# Patient Record
Sex: MALE | Race: WHITE | NOT HISPANIC OR LATINO | Employment: FULL TIME | URBAN - METROPOLITAN AREA
[De-identification: names, ages, dates, MRNs, and addresses within clinical notes are randomized per-mention and may not be internally consistent; named-entity substitution may affect disease eponyms.]

---

## 2018-05-24 ENCOUNTER — HOSPITAL ENCOUNTER (EMERGENCY)
Facility: HOSPITAL | Age: 28
Discharge: HOME | End: 2018-05-24
Attending: EMERGENCY MEDICINE | Admitting: EMERGENCY MEDICINE
Payer: COMMERCIAL

## 2018-05-24 VITALS
DIASTOLIC BLOOD PRESSURE: 84 MMHG | HEART RATE: 96 BPM | BODY MASS INDEX: 26.88 KG/M2 | WEIGHT: 182 LBS | SYSTOLIC BLOOD PRESSURE: 141 MMHG | RESPIRATION RATE: 20 BRPM | TEMPERATURE: 97.8 F | OXYGEN SATURATION: 98 %
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
## 2018-05-24 PROCEDURE — 99283 EMERGENCY DEPT VISIT LOW MDM: CPT

## 2018-05-24 RX ORDER — TETRACAINE HYDROCHLORIDE 5 MG/ML
1 SOLUTION OPHTHALMIC ONCE
Status: COMPLETED | OUTPATIENT
Start: 2018-05-24 | End: 2018-05-24

## 2018-05-24 RX ORDER — NAPROXEN 500 MG/1
500 TABLET ORAL ONCE
Status: COMPLETED | OUTPATIENT
Start: 2018-05-24 | End: 2018-05-24

## 2018-05-24 RX ORDER — FENOFIBRATE 145 MG/1
145 TABLET, COATED ORAL DAILY
Status: ON HOLD | COMMUNITY
End: 2021-01-25 | Stop reason: SDUPTHER

## 2018-05-24 RX ADMIN — NAPROXEN 500 MG: 500 TABLET ORAL at 23:23

## 2018-05-24 RX ADMIN — FLUORESCEIN SODIUM 1 STRIP: 0.6 STRIP OPHTHALMIC at 21:55

## 2018-05-24 RX ADMIN — TETRACAINE HYDROCHLORIDE 1 DROP: 5 SOLUTION OPHTHALMIC at 21:55

## 2018-05-25 NOTE — DISCHARGE INSTRUCTIONS
Corneal Flash Huffman   WHAT YOU NEED TO KNOW:   A corneal flash burn is when your cornea is burned by too much ultraviolet (UV) light  The cornea is the clear layer of tissue that covers the front of your eye  DISCHARGE INSTRUCTIONS:   Medicines:   · Pain medicine: You may be given prescription medicine to take away or decrease pain  Do not wait until the pain is severe before you take your medicine  This medicine may be given as an eye drop or pill  · Antibiotic medicine: This medicine will help prevent an eye infection  It may be given as an eye drop or ointment  · Cycloplegic medicine: This medicine dilates your pupil and relaxes your eye muscles  This will help decrease your pain  · Take your medicine as directed  Contact your healthcare provider if you think your medicine is not helping or if you have side effects  Tell him of her if you are allergic to any medicine  Keep a list of the medicines, vitamins, and herbs you take  Include the amounts, and when and why you take them  Bring the list or the pill bottles to follow-up visits  Carry your medicine list with you in case of an emergency  Follow up with your healthcare provider or ophthalmologist in 12 to 24 hours: You may need to return to have your eye and vision checked  Write down your questions so you remember to ask them during your visits  Artificial tears and ointment:  Artificial tears are used to keep your eye moist  Ointment is used to soothe and protect your eye  This will decrease your pain and help prevent your eyelid from sticking to your eye  Use as directed  Cool, moist bandage: This is applied to your eye and covered with a small ice pack to decrease pain  Use as directed  Eye patch: An eye patch or plastic shield will help protect your eye as it heals  Wear as long as directed by your healthcare provider    Prevent another corneal flash burn:   · Wear sunglasses when you are outside:  Check your sunglasses for a label that says it blocks UV light  Choose sunglasses that protect as much of your eyes as possible  Do not look directly into the sun  · Wear a hat:  Wear a hat or a cap with a wide brim to shade your eyes from sunlight  · Wear tanning bed goggles: This will decrease the amount of UV light that reaches your eyes while you tan  · Wear proper work equipment:  Goggles and helmets will help protect your eyes if you work with welding tools  Contact your healthcare provider or ophthalmologist if:   · You have pain after 2 days of treatment  · Your vision does not return to normal     · You have questions or concerns about your condition or care  Return to the emergency department if:   · You have severe pain  · Your eye is leaking blood or pus  · Your vision suddenly becomes worse  © 2017 [facility] Information is for End User's use only and may not be sold, redistributed or otherwise used for commercial purposes  All illustrations and images included in CareNotes® are the copyrighted property of [employer]  or Lupillo Negron  The above information is an  only  It is not intended as medical advice for individual conditions or treatments  Talk to your doctor, nurse or pharmacist before following any medical regimen to see if it is safe and effective for you

## 2018-05-25 NOTE — ED PROVIDER NOTES
History  Chief Complaint   Patient presents with    Burning Eyes     States he is a  , put up his helmet to chip slag away at 3 pm and eyes started with burning /tearing immediately and is getting worse  Had use lubricant eye drops  Patient drove to ED    [facility]     States when he was chipping slag hot slag went into left eye       DID NOT USE THE HELMET AT THE END OF THE JOB  C/O B/L EYE BURNING PAIN, TEARS  FLUORESCENCE EXAM NEG FOR ULCERS  Eye Pain   Location:  B/L EYES  Severity:  Moderate  Onset quality:  Sudden  Duration:  6 hours  Timing:  Constant  Progression:  Worsening  Chronicity:  New      Prior to Admission Medications   Prescriptions Last Dose Informant Patient Reported? Taking?   fenofibrate (TRICOR) 145 mg tablet 5/24/2018 at Unknown time Self Yes Yes   Sig: Take 145 mg by mouth daily      Facility-Administered Medications: None       Past Medical History:   Diagnosis Date    Hyperlipidemia        Past Surgical History:   Procedure Laterality Date    BURN DEBRIDEMENT SURGERY         History reviewed  No pertinent family history  I have reviewed and agree with the history as documented  Social History   Substance Use Topics    Smoking status: Current Every Day Smoker     Packs/day: 1 50     Types: Cigarettes    Smokeless tobacco: Never Used    Alcohol use Yes      Comment: social         Review of Systems   Constitutional: Negative  HENT: Negative  Eyes: Positive for pain  Physical Exam  Physical Exam   Constitutional: He appears well-developed and well-nourished  No distress  HENT:   Head: Normocephalic and atraumatic  Eyes: EOM are normal  Pupils are equal, round, and reactive to light  No foreign body present in the right eye  No foreign body present in the left eye  Right conjunctiva is injected  Left conjunctiva is injected  Skin: He is not diaphoretic  Nursing note and vitals reviewed        Vital Signs  ED Triage Vitals   Temperature Pulse Respirations Blood Pressure SpO2   05/24/18 2134 05/24/18 2134 05/24/18 2134 05/24/18 2134 05/24/18 2137   97 8 °F (36 6 °C) 96 20 141/84 98 %      Temp Source Heart Rate Source Patient Position - Orthostatic VS BP Location FiO2 (%)   05/24/18 2134 05/24/18 2134 05/24/18 2134 05/24/18 2134 --   Tympanic Monitor Lying Left arm       Pain Score       05/24/18 2137       8           Vitals:    05/24/18 2134   BP: 141/84   Pulse: 96   Patient Position - Orthostatic VS: Lying       Visual Acuity  Visual Acuity      Most Recent Value   Visual acuity R eye is  20/20 [(-1)]   Visual acuity Left eye is  20/20 [(-2)]   No corrective eyewear/lenses  Yes          ED Medications  Medications   tetracaine 0 5 % ophthalmic solution 1 drop (1 drop Both Eyes Given 5/24/18 2155)   fluorescein sodium sterile ophthalmic strip 1 strip (1 strip Both Eyes Given 5/24/18 2155)   naproxen (NAPROSYN) tablet 500 mg (500 mg Oral Given 5/24/18 2323)       Diagnostic Studies  Results Reviewed     None                 No orders to display              Procedures  Procedures       Phone Contacts  ED Phone Contact    ED Course                               MDM  Number of Diagnoses or Management Options  Diagnosis management comments: PAIN IMPROVED, PT HAS ARTIFICIAL TEARS BOTTLE    CritCare Time    Disposition  Final diagnoses:   Exposure to welding light (arc), initial encounter     Time reflects when diagnosis was documented in both MDM as applicable and the Disposition within this note     Time User Action Codes Description Comment    5/24/2018 11:47 PM Enrique Horta Add [W89  0XXA] Exposure to welding light (arc), initial encounter       ED Disposition     ED Disposition Condition Comment    Discharge  [address] discharge to home/self care      Condition at discharge: Stable        Follow-up Information     Follow up With Specialties Details Why Susu Strong MD Ophthalmology Schedule an appointment as soon as possible for a visit in 2 days  [address] [place] [address]  [phone]            Patient's Medications   Discharge Prescriptions    No medications on file     No discharge procedures on file      ED Provider  Electronically Signed by           Rhonda Cho MD  05/24/18 2702

## 2021-01-23 ENCOUNTER — APPOINTMENT (EMERGENCY)
Dept: RADIOLOGY | Facility: HOSPITAL | Age: 31
End: 2021-01-23
Attending: EMERGENCY MEDICINE
Payer: COMMERCIAL

## 2021-01-23 ENCOUNTER — HOSPITAL ENCOUNTER (OUTPATIENT)
Facility: HOSPITAL | Age: 31
Setting detail: OBSERVATION
Discharge: HOME | End: 2021-01-25
Attending: EMERGENCY MEDICINE | Admitting: INTERNAL MEDICINE
Payer: COMMERCIAL

## 2021-01-23 DIAGNOSIS — R07.9 CHEST PAIN: Primary | ICD-10-CM

## 2021-01-23 DIAGNOSIS — R94.31 ABNORMAL EKG: ICD-10-CM

## 2021-01-23 PROBLEM — E87.6 HYPOKALEMIA: Status: ACTIVE | Noted: 2021-01-23

## 2021-01-23 PROBLEM — D72.829 LEUKOCYTOSIS: Status: ACTIVE | Noted: 2021-01-23

## 2021-01-23 PROBLEM — E78.2 MIXED HYPERLIPIDEMIA: Status: ACTIVE | Noted: 2021-01-23

## 2021-01-23 LAB
ALBUMIN SERPL BCP-MCNC: 4.4 G/DL (ref 3.5–5)
ALP SERPL-CCNC: 70 U/L (ref 46–116)
ALT SERPL W P-5'-P-CCNC: 34 U/L (ref 12–78)
ANION GAP SERPL CALCULATED.3IONS-SCNC: 9 MMOL/L (ref 4–13)
APTT PPP: 30 SECONDS (ref 23–37)
AST SERPL W P-5'-P-CCNC: 19 U/L (ref 5–45)
BASOPHILS # BLD AUTO: 0.05 THOUSANDS/ΜL (ref 0–0.1)
BASOPHILS NFR BLD AUTO: 0 % (ref 0–1)
BILIRUB SERPL-MCNC: 1.1 MG/DL (ref 0.2–1)
BUN SERPL-MCNC: 17 MG/DL (ref 5–25)
CALCIUM SERPL-MCNC: 9 MG/DL (ref 8.3–10.1)
CHLORIDE SERPL-SCNC: 98 MMOL/L (ref 100–108)
CO2 SERPL-SCNC: 27 MMOL/L (ref 21–32)
CREAT SERPL-MCNC: 1 MG/DL (ref 0.6–1.3)
D DIMER PPP FEU-MCNC: 0.38 UG/ML FEU
EOSINOPHIL # BLD AUTO: 0.12 THOUSAND/ΜL (ref 0–0.61)
EOSINOPHIL NFR BLD AUTO: 1 % (ref 0–6)
ERYTHROCYTE [DISTWIDTH] IN BLOOD BY AUTOMATED COUNT: 11.8 % (ref 11.6–15.1)
GFR SERPL CREATININE-BSD FRML MDRD: 99 ML/MIN/1.73SQ M
GLUCOSE SERPL-MCNC: 127 MG/DL (ref 65–140)
HCT VFR BLD AUTO: 46.5 % (ref 36.5–49.3)
HGB BLD-MCNC: 15.6 G/DL (ref 12–17)
IMM GRANULOCYTES # BLD AUTO: 0.08 THOUSAND/UL (ref 0–0.2)
IMM GRANULOCYTES NFR BLD AUTO: 1 % (ref 0–2)
INR PPP: 1.01 (ref 0.84–1.19)
LYMPHOCYTES # BLD AUTO: 2.12 THOUSANDS/ΜL (ref 0.6–4.47)
LYMPHOCYTES NFR BLD AUTO: 12 % (ref 14–44)
MCH RBC QN AUTO: 29.8 PG (ref 26.8–34.3)
MCHC RBC AUTO-ENTMCNC: 33.5 G/DL (ref 31.4–37.4)
MCV RBC AUTO: 89 FL (ref 82–98)
MONOCYTES # BLD AUTO: 1.47 THOUSAND/ΜL (ref 0.17–1.22)
MONOCYTES NFR BLD AUTO: 9 % (ref 4–12)
NEUTROPHILS # BLD AUTO: 13.26 THOUSANDS/ΜL (ref 1.85–7.62)
NEUTS SEG NFR BLD AUTO: 77 % (ref 43–75)
NRBC BLD AUTO-RTO: 0 /100 WBCS
PLATELET # BLD AUTO: 291 THOUSANDS/UL (ref 149–390)
PMV BLD AUTO: 8.9 FL (ref 8.9–12.7)
POTASSIUM SERPL-SCNC: 3.2 MMOL/L (ref 3.5–5.3)
PROT SERPL-MCNC: 8 G/DL (ref 6.4–8.2)
PROTHROMBIN TIME: 13.2 SECONDS (ref 11.6–14.5)
RBC # BLD AUTO: 5.24 MILLION/UL (ref 3.88–5.62)
SODIUM SERPL-SCNC: 134 MMOL/L (ref 136–145)
TROPONIN I SERPL-MCNC: <0.02 NG/ML
TROPONIN I SERPL-MCNC: <0.02 NG/ML
WBC # BLD AUTO: 17.1 THOUSAND/UL (ref 4.31–10.16)

## 2021-01-23 PROCEDURE — 85379 FIBRIN DEGRADATION QUANT: CPT | Performed by: EMERGENCY MEDICINE

## 2021-01-23 PROCEDURE — 85025 COMPLETE CBC W/AUTO DIFF WBC: CPT | Performed by: EMERGENCY MEDICINE

## 2021-01-23 PROCEDURE — 36415 COLL VENOUS BLD VENIPUNCTURE: CPT | Performed by: EMERGENCY MEDICINE

## 2021-01-23 PROCEDURE — 84484 ASSAY OF TROPONIN QUANT: CPT | Performed by: EMERGENCY MEDICINE

## 2021-01-23 PROCEDURE — 71045 X-RAY EXAM CHEST 1 VIEW: CPT

## 2021-01-23 PROCEDURE — 80053 COMPREHEN METABOLIC PANEL: CPT | Performed by: EMERGENCY MEDICINE

## 2021-01-23 PROCEDURE — 99285 EMERGENCY DEPT VISIT HI MDM: CPT

## 2021-01-23 PROCEDURE — 85610 PROTHROMBIN TIME: CPT | Performed by: EMERGENCY MEDICINE

## 2021-01-23 PROCEDURE — 99285 EMERGENCY DEPT VISIT HI MDM: CPT | Performed by: EMERGENCY MEDICINE

## 2021-01-23 PROCEDURE — 84484 ASSAY OF TROPONIN QUANT: CPT | Performed by: NURSE PRACTITIONER

## 2021-01-23 PROCEDURE — 93005 ELECTROCARDIOGRAM TRACING: CPT

## 2021-01-23 PROCEDURE — 99220 PR INITIAL OBSERVATION CARE/DAY 70 MINUTES: CPT | Performed by: NURSE PRACTITIONER

## 2021-01-23 PROCEDURE — 85730 THROMBOPLASTIN TIME PARTIAL: CPT | Performed by: EMERGENCY MEDICINE

## 2021-01-23 RX ORDER — NICOTINE 21 MG/24HR
1 PATCH, TRANSDERMAL 24 HOURS TRANSDERMAL DAILY
Status: DISCONTINUED | OUTPATIENT
Start: 2021-01-24 | End: 2021-01-25 | Stop reason: HOSPADM

## 2021-01-23 RX ORDER — FENOFIBRATE 145 MG/1
145 TABLET, COATED ORAL DAILY
Status: DISCONTINUED | OUTPATIENT
Start: 2021-01-24 | End: 2021-01-25 | Stop reason: HOSPADM

## 2021-01-23 RX ORDER — POTASSIUM CHLORIDE 20 MEQ/1
40 TABLET, EXTENDED RELEASE ORAL ONCE
Status: COMPLETED | OUTPATIENT
Start: 2021-01-23 | End: 2021-01-23

## 2021-01-23 RX ORDER — ASPIRIN 81 MG/1
81 TABLET, CHEWABLE ORAL DAILY
Status: DISCONTINUED | OUTPATIENT
Start: 2021-01-24 | End: 2021-01-25 | Stop reason: HOSPADM

## 2021-01-23 RX ORDER — NITROGLYCERIN 0.4 MG/1
0.4 TABLET SUBLINGUAL
Status: DISCONTINUED | OUTPATIENT
Start: 2021-01-23 | End: 2021-01-25 | Stop reason: HOSPADM

## 2021-01-23 RX ORDER — ASPIRIN 81 MG/1
324 TABLET, CHEWABLE ORAL ONCE
Status: COMPLETED | OUTPATIENT
Start: 2021-01-23 | End: 2021-01-23

## 2021-01-23 RX ADMIN — NITROGLYCERIN 0.4 MG: 0.4 TABLET SUBLINGUAL at 19:12

## 2021-01-23 RX ADMIN — NITROGLYCERIN 0.4 MG: 0.4 TABLET SUBLINGUAL at 19:24

## 2021-01-23 RX ADMIN — ASPIRIN 81 MG CHEWABLE TABLET 324 MG: 81 TABLET CHEWABLE at 19:10

## 2021-01-23 RX ADMIN — ENOXAPARIN SODIUM 80 MG: 80 INJECTION SUBCUTANEOUS at 20:06

## 2021-01-23 RX ADMIN — POTASSIUM CHLORIDE 40 MEQ: 1500 TABLET, EXTENDED RELEASE ORAL at 19:43

## 2021-01-24 PROBLEM — E87.6 HYPOKALEMIA: Status: RESOLVED | Noted: 2021-01-23 | Resolved: 2021-01-24

## 2021-01-24 LAB
ALBUMIN SERPL BCP-MCNC: 3.8 G/DL (ref 3.5–5)
ALP SERPL-CCNC: 61 U/L (ref 46–116)
ALT SERPL W P-5'-P-CCNC: 30 U/L (ref 12–78)
AMPHETAMINES SERPL QL SCN: NEGATIVE
ANION GAP SERPL CALCULATED.3IONS-SCNC: 9 MMOL/L (ref 4–13)
AST SERPL W P-5'-P-CCNC: 16 U/L (ref 5–45)
BARBITURATES UR QL: NEGATIVE
BASOPHILS # BLD AUTO: 0.03 THOUSANDS/ΜL (ref 0–0.1)
BASOPHILS NFR BLD AUTO: 0 % (ref 0–1)
BENZODIAZ UR QL: NEGATIVE
BILIRUB SERPL-MCNC: 1 MG/DL (ref 0.2–1)
BUN SERPL-MCNC: 12 MG/DL (ref 5–25)
CALCIUM SERPL-MCNC: 8.4 MG/DL (ref 8.3–10.1)
CHLORIDE SERPL-SCNC: 102 MMOL/L (ref 100–108)
CHOLEST SERPL-MCNC: 218 MG/DL (ref 50–200)
CO2 SERPL-SCNC: 26 MMOL/L (ref 21–32)
COCAINE UR QL: POSITIVE
CREAT SERPL-MCNC: 0.97 MG/DL (ref 0.6–1.3)
EOSINOPHIL # BLD AUTO: 0.33 THOUSAND/ΜL (ref 0–0.61)
EOSINOPHIL NFR BLD AUTO: 4 % (ref 0–6)
ERYTHROCYTE [DISTWIDTH] IN BLOOD BY AUTOMATED COUNT: 12 % (ref 11.6–15.1)
EST. AVERAGE GLUCOSE BLD GHB EST-MCNC: 100 MG/DL
GFR SERPL CREATININE-BSD FRML MDRD: 103 ML/MIN/1.73SQ M
GLUCOSE P FAST SERPL-MCNC: 106 MG/DL (ref 65–99)
GLUCOSE SERPL-MCNC: 106 MG/DL (ref 65–140)
HBA1C MFR BLD: 5.1 %
HCT VFR BLD AUTO: 44.6 % (ref 36.5–49.3)
HDLC SERPL-MCNC: 38 MG/DL
HGB BLD-MCNC: 14.6 G/DL (ref 12–17)
IMM GRANULOCYTES # BLD AUTO: 0.03 THOUSAND/UL (ref 0–0.2)
IMM GRANULOCYTES NFR BLD AUTO: 0 % (ref 0–2)
LDLC SERPL CALC-MCNC: 143 MG/DL (ref 0–100)
LYMPHOCYTES # BLD AUTO: 3.23 THOUSANDS/ΜL (ref 0.6–4.47)
LYMPHOCYTES NFR BLD AUTO: 40 % (ref 14–44)
MCH RBC QN AUTO: 30 PG (ref 26.8–34.3)
MCHC RBC AUTO-ENTMCNC: 32.7 G/DL (ref 31.4–37.4)
MCV RBC AUTO: 92 FL (ref 82–98)
METHADONE UR QL: NEGATIVE
MONOCYTES # BLD AUTO: 0.99 THOUSAND/ΜL (ref 0.17–1.22)
MONOCYTES NFR BLD AUTO: 12 % (ref 4–12)
NEUTROPHILS # BLD AUTO: 3.57 THOUSANDS/ΜL (ref 1.85–7.62)
NEUTS SEG NFR BLD AUTO: 44 % (ref 43–75)
NONHDLC SERPL-MCNC: 180 MG/DL
NRBC BLD AUTO-RTO: 0 /100 WBCS
OPIATES UR QL SCN: NEGATIVE
OXYCODONE+OXYMORPHONE UR QL SCN: NEGATIVE
PCP UR QL: NEGATIVE
PLATELET # BLD AUTO: 267 THOUSANDS/UL (ref 149–390)
PMV BLD AUTO: 9.4 FL (ref 8.9–12.7)
POTASSIUM SERPL-SCNC: 4 MMOL/L (ref 3.5–5.3)
PROT SERPL-MCNC: 7.2 G/DL (ref 6.4–8.2)
RBC # BLD AUTO: 4.87 MILLION/UL (ref 3.88–5.62)
SODIUM SERPL-SCNC: 137 MMOL/L (ref 136–145)
THC UR QL: POSITIVE
TRIGL SERPL-MCNC: 185 MG/DL
TROPONIN I SERPL-MCNC: <0.02 NG/ML
WBC # BLD AUTO: 8.18 THOUSAND/UL (ref 4.31–10.16)

## 2021-01-24 PROCEDURE — 99244 OFF/OP CNSLTJ NEW/EST MOD 40: CPT | Performed by: INTERNAL MEDICINE

## 2021-01-24 PROCEDURE — 85025 COMPLETE CBC W/AUTO DIFF WBC: CPT | Performed by: NURSE PRACTITIONER

## 2021-01-24 PROCEDURE — 99226 PR SBSQ OBSERVATION CARE/DAY 35 MINUTES: CPT | Performed by: INTERNAL MEDICINE

## 2021-01-24 PROCEDURE — 80061 LIPID PANEL: CPT | Performed by: NURSE PRACTITIONER

## 2021-01-24 PROCEDURE — 84484 ASSAY OF TROPONIN QUANT: CPT | Performed by: NURSE PRACTITIONER

## 2021-01-24 PROCEDURE — 80307 DRUG TEST PRSMV CHEM ANLYZR: CPT | Performed by: NURSE PRACTITIONER

## 2021-01-24 PROCEDURE — 80053 COMPREHEN METABOLIC PANEL: CPT | Performed by: NURSE PRACTITIONER

## 2021-01-24 PROCEDURE — 93005 ELECTROCARDIOGRAM TRACING: CPT

## 2021-01-24 PROCEDURE — 83036 HEMOGLOBIN GLYCOSYLATED A1C: CPT | Performed by: NURSE PRACTITIONER

## 2021-01-24 RX ORDER — ATORVASTATIN CALCIUM 20 MG/1
20 TABLET, FILM COATED ORAL
Status: DISCONTINUED | OUTPATIENT
Start: 2021-01-24 | End: 2021-01-25 | Stop reason: HOSPADM

## 2021-01-24 RX ADMIN — ASPIRIN 81 MG CHEWABLE TABLET 81 MG: 81 TABLET CHEWABLE at 09:03

## 2021-01-24 RX ADMIN — FENOFIBRATE 145 MG: 145 TABLET ORAL at 09:03

## 2021-01-24 RX ADMIN — ENOXAPARIN SODIUM 80 MG: 80 INJECTION SUBCUTANEOUS at 20:34

## 2021-01-24 RX ADMIN — ATORVASTATIN CALCIUM 20 MG: 40 TABLET, FILM COATED ORAL at 16:52

## 2021-01-24 RX ADMIN — ENOXAPARIN SODIUM 80 MG: 80 INJECTION SUBCUTANEOUS at 09:03

## 2021-01-24 NOTE — ASSESSMENT & PLAN NOTE
No fevers, chills, cough  D-dimer is negative  CXR pending  Consider CT chest depending on clinical course

## 2021-01-24 NOTE — H&P
H&P- Lonnie Perea 1990, 26 y o  male MRN: [id number]    Unit/Bed#: [address] [address] Encounter: [id number]    Primary Care Provider: Ulisses Gonzalez MD   Date and time admitted to hospital: 1/23/2021  6:55 PM    * Chest pain  Assessment & Plan  Patient presented to the ED with complaints of right sided chest pain radiating into the right shoulder blade after getting out of the shower  It felt like a tightness  It is worse with deep inspiration associated with some diaphoresis  No nausea, vomiting  Pain improved with nitro sublingual in the ER, now resolved  EKG revealed t wave inversions in V4-V6  · Admit to Medicine  · Serial EKGs and troponin  · Continue aspirin, statin  · Cardiology consultation - recs received in ER for lovenox and AM consult  · Lipid panel and hemoglobin A1c in the a m  Mixed hyperlipidemia  Assessment & Plan  Noncompliant, check lipid panel      VTE Prophylaxis: Enoxaparin (Lovenox)  / sequential compression device   Code Status: No Order    Anticipated Length of Stay:  Patient will be admitted on an Observation basis with an anticipated length of stay of less than 2 midnights  Justification for Hospital Stay: chest pain, abnormal EKG    Total Time for Visit, including Counseling / Coordination of Care: 15 minutes  Greater than 50% of this total time spent on direct patient counseling and coordination of care  Chief Complaint:   Chest Pain (pt states started about 1 5 hrs ago with pain in center of chest through to right shoulder blade  Pain slightly increases with movement of right arm  Denies difficulty breathing)      History of Present Illness:    Lonnie Perea is a 26 y o  male with a PMH of tobacco dependence, hyperlipidemia who presents with complaints of chest pain  Patient presented to the ED with complaints of right sided chest pain radiating into the right shoulder blade after getting out of the shower  It felt like a tightness    It is worse with deep inspiration associated with some diaphoresis  No nausea, vomiting  Pain improved with nitro sublingual in the ER, now resolved  EKG revealed t wave inversions in V4-V6  Cardiology recommended admission with consultation in the AM       Review of Systems:    Review of Systems   Constitutional: Positive for diaphoresis  Negative for chills and fever  HENT: Negative  Eyes: Negative  Respiratory: Negative  Cardiovascular: Positive for chest pain  Gastrointestinal: Negative  Endocrine: Negative  Genitourinary: Negative  Musculoskeletal: Negative  Skin: Negative  Allergic/Immunologic: Negative  Neurological: Negative  Hematological: Negative  Psychiatric/Behavioral: Negative  Past Medical and Surgical History:     Past Medical History:   Diagnosis Date    Hyperlipidemia        Past Surgical History:   Procedure Laterality Date    BURN DEBRIDEMENT SURGERY      WRIST SURGERY         Meds/Allergies:    Prior to Admission medications    Medication Sig Start Date End Date Taking?  Authorizing Provider   fenofibrate (TRICOR) 145 mg tablet Take 145 mg by mouth daily    Historical Provider, MD       Allergies: No Known Allergies    Social History:     Marital Status: Single   Substance Use History:   Social History     Substance and Sexual Activity   Alcohol Use Yes    Comment: social      Social History     Tobacco Use   Smoking Status Current Every Day Smoker    Packs/day: 1 00    Types: Cigarettes   Smokeless Tobacco Never Used     Social History     Substance and Sexual Activity   Drug Use Yes    Types: Marijuana       Family History:    Family History   Problem Relation Age of Onset    Diabetes Mother     Heart disease Father         bypass; valves - 40s       Physical Exam:     Vitals:   Blood Pressure: 151/81 (01/23/21 2034)  Pulse: 70 (01/23/21 2034)  Temperature: (!) 97 3 °F (36 3 °C) (01/23/21 2034)  Temp Source: Tympanic (01/23/21 1900)  Respirations: 18 (01/23/21 2034)  Weight - Scale: 84 4 kg (186 lb) (01/23/21 1952)  SpO2: 97 % (01/23/21 2034)    Physical Exam  Vitals signs and nursing note reviewed  Constitutional:       Appearance: Normal appearance  HENT:      Head: Normocephalic  Nose: Nose normal    Eyes:      Extraocular Movements: Extraocular movements intact  Cardiovascular:      Rate and Rhythm: Normal rate and regular rhythm  Heart sounds: No murmur  Pulmonary:      Effort: Pulmonary effort is normal       Breath sounds: Wheezing present  Abdominal:      General: Abdomen is flat  Bowel sounds are normal  There is no distension  Palpations: Abdomen is soft  Tenderness: There is no abdominal tenderness  Musculoskeletal: Normal range of motion  Skin:     General: Skin is warm and dry  Neurological:      General: No focal deficit present  Mental Status: He is alert and oriented to person, place, and time  Psychiatric:         Mood and Affect: Mood normal          Behavior: Behavior normal            Additional Data:     Lab Results: I have personally reviewed pertinent reports  Results from last 7 days   Lab Units 01/23/21  1903   WBC Thousand/uL 17 10*   HEMOGLOBIN g/dL 15 6   HEMATOCRIT % 46 5   PLATELETS Thousands/uL 291   NEUTROS PCT % 77*     Results from last 7 days   Lab Units 01/23/21  1903   SODIUM mmol/L 134*   POTASSIUM mmol/L 3 2*   CHLORIDE mmol/L 98*   CO2 mmol/L 27   BUN mg/dL 17   CREATININE mg/dL 1 00   CALCIUM mg/dL 9 0   TOTAL BILIRUBIN mg/dL 1 10*   ALK PHOS U/L 70   ALT U/L 34   AST U/L 19     Results from last 7 days   Lab Units 01/23/21  1904   INR  1 01     Results from last 7 days   Lab Units 01/23/21  1903   TROPONIN I ng/mL <0 02               Imaging: I have personally reviewed pertinent reports        XR chest 1 view portable   ED Interpretation by Christian Angel MD (23/62 5870)   NAD          XR chest 1 view portable   ED Interpretation   NAD          EKG, Pathology, and Other Studies Reviewed on Admission:   · EKG: NSR with t wave inversions in II, V4-V6    Allscripts / Epic Records Reviewed: Yes     ** Please Note: This note has been constructed using a voice recognition system   **

## 2021-01-24 NOTE — ASSESSMENT & PLAN NOTE
Patient presented to the ED with complaints of right sided chest pain radiating into the right shoulder blade after getting out of the shower  It felt like a tightness  It is worse with deep inspiration associated with some diaphoresis  No nausea, vomiting  Pain improved with nitro sublingual in the ER, now resolved  EKG revealed t wave inversions in V4-V6  · Serial troponins were negative  · Continue aspirin, statin  · Cardiology consultation - recs received in ER for lovenox and AM consult  · Lipid panel showed total cholesterol level 218 and LDL level of 143  · Repeat EKG showed T inversions in the lateral leads  · Patient did have history of cocaine use  · Patient's AAYUSH score was 2  · Patient is scheduled for nuclear stress test in a m

## 2021-01-24 NOTE — ED PROVIDER NOTES
History  Chief Complaint   Patient presents with    Chest Pain     pt states started about 1 5 hrs ago with pain in center of chest through to right shoulder blade  Pain slightly increases with movement of right arm  Denies difficulty breathing     25 yo male c/o substernal chest pain that started about 90 minutes ago  Pain radiates to right shoulder and back  No nausea, vomiting  No recent fever, cough  + smoker   + h/o high chol and non-compliant with taking medicine   + FH of heart disease in father and cousin  Pain is described as a tightness, pressure  History provided by:  Patient   used: No    Chest Pain  Associated symptoms: no abdominal pain, no back pain, no cough, no dizziness, no fever, no headache, no nausea, no shortness of breath and not vomiting        Prior to Admission Medications   Prescriptions Last Dose Informant Patient Reported? Taking?   fenofibrate (TRICOR) 145 mg tablet Not Taking at Unknown time Self Yes No   Sig: Take 145 mg by mouth daily      Facility-Administered Medications: None       Past Medical History:   Diagnosis Date    Hyperlipidemia        Past Surgical History:   Procedure Laterality Date    BURN DEBRIDEMENT SURGERY         History reviewed  No pertinent family history  I have reviewed and agree with the history as documented  E-Cigarette/Vaping    E-Cigarette Use Never User      E-Cigarette/Vaping Substances     Social History     Tobacco Use    Smoking status: Current Every Day Smoker     Packs/day: 1 00     Types: Cigarettes    Smokeless tobacco: Never Used   Substance Use Topics    Alcohol use: Yes     Comment: social     Drug use: Yes     Types: Marijuana       Review of Systems   Constitutional: Negative  Negative for chills and fever  HENT: Negative  Negative for congestion and sore throat  Eyes: Negative  Respiratory: Negative  Negative for cough and shortness of breath  Cardiovascular: Positive for chest pain  Negative for leg swelling  Gastrointestinal: Negative  Negative for abdominal pain, diarrhea, nausea and vomiting  Genitourinary: Negative  Negative for dysuria, flank pain and hematuria  Musculoskeletal: Negative  Negative for back pain and myalgias  Skin: Negative  Negative for rash and wound  Neurological: Negative  Negative for dizziness and headaches  Psychiatric/Behavioral: Negative  Negative for confusion and hallucinations  The patient is not nervous/anxious  All other systems reviewed and are negative  Physical Exam  Physical Exam  Vitals signs and nursing note reviewed  Constitutional:       General: He is not in acute distress  Appearance: Normal appearance  He is well-developed and normal weight  He is not ill-appearing or diaphoretic  HENT:      Head: Normocephalic and atraumatic  Eyes:      General: No scleral icterus  Conjunctiva/sclera: Conjunctivae normal    Neck:      Musculoskeletal: Normal range of motion and neck supple  Cardiovascular:      Rate and Rhythm: Normal rate and regular rhythm  Heart sounds: Normal heart sounds  No murmur  Pulmonary:      Effort: Pulmonary effort is normal  No respiratory distress  Breath sounds: Normal breath sounds  Chest:      Chest wall: No tenderness  Abdominal:      General: Bowel sounds are normal  There is no distension  Palpations: Abdomen is soft  Tenderness: There is no abdominal tenderness  Musculoskeletal: Normal range of motion  General: No tenderness or deformity  Right lower leg: No edema  Left lower leg: No edema  Skin:     General: Skin is warm and dry  Coloration: Skin is not pale  Findings: No erythema or rash  Neurological:      Mental Status: He is alert  Cranial Nerves: No cranial nerve deficit     Psychiatric:         Mood and Affect: Mood normal          Behavior: Behavior normal          Vital Signs  ED Triage Vitals   Temperature Pulse Respirations Blood Pressure SpO2   01/23/21 1900 01/23/21 1900 01/23/21 1900 01/23/21 1900 01/23/21 1900   (!) 97 1 °F (36 2 °C) 88 16 155/85 97 %      Temp Source Heart Rate Source Patient Position - Orthostatic VS BP Location FiO2 (%)   01/23/21 1900 -- 01/23/21 1900 01/23/21 1900 --   Tympanic  Lying Left arm       Pain Score       01/23/21 1857       7           Vitals:    01/23/21 1900 01/23/21 1915   BP: 155/85 144/67   Pulse: 88 94   Patient Position - Orthostatic VS: Lying          Visual Acuity      ED Medications  Medications   nitroglycerin (NITROSTAT) SL tablet 0 4 mg (0 4 mg Sublingual Given 1/23/21 1924)   enoxaparin (LOVENOX) injection 1 mg/kg (has no administration in time range)   aspirin chewable tablet 324 mg (324 mg Oral Given 1/23/21 1910)   potassium chloride (K-DUR,KLOR-CON) CR tablet 40 mEq (40 mEq Oral Given 1/23/21 1943)       Diagnostic Studies  Results Reviewed     Procedure Component Value Units Date/Time    Troponin I [[id number]]  (Normal) Collected: 01/23/21 1903    Lab Status: Final result Specimen: Blood from Arm, Right Updated: 01/23/21 1929     Troponin I <0 02 ng/mL     Comprehensive metabolic panel [[id number]]  (Abnormal) Collected: 01/23/21 1903    Lab Status: Final result Specimen: Blood from Arm, Right Updated: 01/23/21 1925     Sodium 134 mmol/L      Potassium 3 2 mmol/L      Chloride 98 mmol/L      CO2 27 mmol/L      ANION GAP 9 mmol/L      BUN 17 mg/dL      Creatinine 1 00 mg/dL      Glucose 127 mg/dL      Calcium 9 0 mg/dL      AST 19 U/L      ALT 34 U/L      Alkaline Phosphatase 70 U/L      Total Protein 8 0 g/dL      Albumin 4 4 g/dL      Total Bilirubin 1 10 mg/dL      eGFR 99 ml/min/1 73sq m     Narrative:      [facility] guidelines for Chronic Kidney Disease (CKD):     Stage 1 with normal or high GFR (GFR > 90 mL/min/1 73 square meters)    Stage 2 Mild CKD (GFR = 60-89 mL/min/1 73 square meters)    Stage 3A Moderate CKD (GFR = 45-59 mL/min/1 73 square meters)    Stage 3B Moderate CKD (GFR = 30-44 mL/min/1 73 square meters)    Stage 4 Severe CKD (GFR = 15-29 mL/min/1 73 square meters)    Stage 5 End Stage CKD (GFR <15 mL/min/1 73 square meters)  Note: GFR calculation is accurate only with a steady state creatinine    D-dimer, quantitative [[id number]]  (Normal) Collected: 01/23/21 1904    Lab Status: Final result Specimen: Blood from Arm, Right Updated: 01/23/21 1922     D-Dimer, Quant 0 38 ug/ml FEU     Protime-INR [[id number]]  (Normal) Collected: 01/23/21 1904    Lab Status: Final result Specimen: Blood from Arm, Right Updated: 01/23/21 1921     Protime 13 2 seconds      INR 1 01    APTT [[id number]]  (Normal) Collected: 01/23/21 1904    Lab Status: Final result Specimen: Blood from Arm, Right Updated: 01/23/21 1921     PTT 30 seconds     CBC and differential [[id number]]  (Abnormal) Collected: 01/23/21 1903    Lab Status: Final result Specimen: Blood from Arm, Right Updated: 01/23/21 1909     WBC 17 10 Thousand/uL      RBC 5 24 Million/uL      Hemoglobin 15 6 g/dL      Hematocrit 46 5 %      MCV 89 fL      MCH 29 8 pg      MCHC 33 5 g/dL      RDW 11 8 %      MPV 8 9 fL      Platelets 978 Thousands/uL      nRBC 0 /100 WBCs      Neutrophils Relative 77 %      Immat GRANS % 1 %      Lymphocytes Relative 12 %      Monocytes Relative 9 %      Eosinophils Relative 1 %      Basophils Relative 0 %      Neutrophils Absolute 13 26 Thousands/µL      Immature Grans Absolute 0 08 Thousand/uL      Lymphocytes Absolute 2 12 Thousands/µL      Monocytes Absolute 1 47 Thousand/µL      Eosinophils Absolute 0 12 Thousand/µL      Basophils Absolute 0 05 Thousands/µL     Rapid drug screen, urine [[id number]]     Lab Status: No result Specimen: Urine                  XR chest 1 view portable   ED Interpretation by Edi Perez MD (90/28 1910)   NAD                 Procedures  ECG 12 Lead Documentation Only    Date/Time: 1/23/2021 7:10 PM  Performed by: Edi Perez MD  Authorized by: Shell Turner MD     Indications / Diagnosis:  Chest pain  ECG reviewed by me, the ED Provider: yes    Patient location:  ED  Previous ECG:     Previous ECG:  Unavailable  Interpretation:     Interpretation: abnormal    Rate:     ECG rate:  94    ECG rate assessment: normal    Rhythm:     Rhythm: sinus rhythm    Ectopy:     Ectopy: none    QRS:     QRS axis:  Normal  Conduction:     Conduction: normal    ST segments:     ST segments:  Non-specific  T waves:     T waves: inverted      Inverted:  I, aVL, V4, V6 and V5             ED Course             HEART Risk Score      Most Recent Value   Heart Score Risk Calculator   History  1 Filed at: 01/23/2021 1943   ECG  2 Filed at: 01/23/2021 1943   Age  0 Filed at: 01/23/2021 1943   Risk Factors  2 Filed at: 01/23/2021 1943   Troponin  0 Filed at: 01/23/2021 1943   HEART Score  5 Filed at: 01/23/2021 1943                                    MDM  Number of Diagnoses or Management Options  Diagnosis management comments: Discussed with Dr Marbella Jeffrey who reviewed EKGs, advises lovenox, admission, and further evaluation  Disposition  Final diagnoses:   Chest pain   Abnormal EKG     Time reflects when diagnosis was documented in both MDM as applicable and the Disposition within this note     Time User Action Codes Description Comment    1/87/1127  7:07 PM Felix MABRY Add [J63 8] Chest pain     4/61/7699  8:23 PM Jayy Cazares Add [W37 97] Abnormal EKG       ED Disposition     ED Disposition Condition Date/Time Comment    Admit Stable Sat Jan 23, 2021  7:43 PM Case was discussed with **Dr Marbella Jeffrey, hospitalist* and the patient's admission status was agreed to be Admission Status: observation status         Follow-up Information    None         Patient's Medications   Discharge Prescriptions    No medications on file     No discharge procedures on file      PDMP Review     None          ED Provider  Electronically Signed by           Shell Turner MD  01/23/21 1944

## 2021-01-24 NOTE — ASSESSMENT & PLAN NOTE
Patient presented to the ED with complaints of right sided chest pain radiating into the right shoulder blade after getting out of the shower  It felt like a tightness  It is worse with deep inspiration associated with some diaphoresis  No nausea, vomiting  Pain improved with nitro sublingual in the ER, now resolved  EKG revealed t wave inversions in V4-V6  · Admit to Medicine  · Serial EKGs and troponin  · Continue aspirin, statin  · Cardiology consultation - recs received in ER for lovenox and AM consult  · Lipid panel and hemoglobin A1c in the a m

## 2021-01-24 NOTE — CONSULTS
Consultation - Cardiology   [facility]     Gume Meeks 26 y o  male MRN: [id number]  : 1990  Unit/Bed#: [address] Encounter: [id number]      Assessment & Plan     1  Chest pain with ACS ruled out  Patient has episodes of chest pain which were relieved with nitro and had EKG changes  He needs a nuclear stress test his EKG has normalized  His father had history of premature coronary artery disease and needed valve surgery  2  Dyslipidemia, but was not taking any medications  3  History of polysubstance abuse with recent Cocaine use and marijuana use    4  Continues tobacco abuse since his 13year-old smokes about 1 pack to 2 packs a day    5  Abnormal EKG with T-wave inversions in lateral precordial leads could be due to spasm in the arteries certainly as they have now normalized  Summary of Recommendations:        Patient is very keen to go home  Patient has dynamic ST changes but he is asymptomatic  Repeat EKG shows no evidence of any ST changes  Discussed with patient that cocaine use can cause accelerated coronary artery disease he will need a nuclear stress test and nuclear stress test   He pathophysiology of coronary artery discussed with him  Will discussed with medical team     Discussed with patient and medical team  Thank you for your consultation  Physician Requesting Consult: Jai Cardenas MD    Reason for Consult / Principal Problem:  Chest pain    Inpatient consult to Cardiology  Consult performed by: Alexander Landry MD  Consult ordered by: SUJIT Dc          HPI: Gume Meeks is a 26y o  year old male who presents with episode of chest pain  Patient who has a medical history of regular marijuana use it cocaine yesterday and has previous history of tobacco abuse  He came to the ED with chest pain  He has had after smoking cocaine in the yesterday and marijuana he was not feeling well and he had felt chest tightness  His pain improved with nitro sublingual in the ED and EKG shows T-wave inversion in V4 to V6  Repeat EKG shows the T-wave have normalized  Patient has use on of cocaine for the last few years  He has started smoking when he was 13year-old  His troponins are negative currently he is chest pain-free  He has family history of heart disease with his father having heart problems  His father has bypass surgery and valve replacement surgery and early 46s  Mother has diabetes mellitus  He had history of dyslipidemia but not taking any medications  Review of Systems   Constitutional: Negative for activity change, chills, diaphoresis, fever and unexpected weight change  HENT: Negative for congestion  Eyes: Negative for discharge and redness  Respiratory: Negative for cough, chest tightness, shortness of breath and wheezing  Cardiovascular: Positive for chest pain  Negative for palpitations and leg swelling  Gastrointestinal: Negative for abdominal pain, diarrhea and nausea  Endocrine: Negative  Genitourinary: Negative for decreased urine volume and urgency  Musculoskeletal: Negative  Negative for arthralgias, back pain and gait problem  Skin: Negative for rash and wound  Allergic/Immunologic: Negative  Neurological: Negative for dizziness, seizures, syncope, weakness, light-headedness and headaches  Hematological: Negative  Psychiatric/Behavioral: Negative for agitation and confusion  The patient is nervous/anxious          Historical Information   Past Medical History:   Diagnosis Date    Hyperlipidemia      Past Surgical History:   Procedure Laterality Date    BURN DEBRIDEMENT SURGERY      WRIST SURGERY       Social History     Substance and Sexual Activity   Alcohol Use Yes    Comment: social      Social History     Substance and Sexual Activity   Drug Use Yes    Types: Marijuana     Social History     Tobacco Use   Smoking Status Current Every Day Smoker    Packs/day: 1 00  Types: Cigarettes   Smokeless Tobacco Never Used     Family History:   Family History   Problem Relation Age of Onset    Diabetes Mother     Heart disease Father         bypass; valves - 45s       Meds/Allergies    PTA meds:    Medications Prior to Admission   Medication    fenofibrate (TRICOR) 145 mg tablet      No Known Allergies    Current Facility-Administered Medications:     aspirin chewable tablet 81 mg, 81 mg, Oral, Daily, Osceola Elenita, CRNP, 81 mg at 01/24/21 0903    enoxaparin (LOVENOX) subcutaneous injection 80 mg, 1 mg/kg, Subcutaneous, Q12H [facility], Osceola Elenita, CRNP, 80 mg at 01/24/21 8487    fenofibrate (TRICOR) tablet 145 mg, 145 mg, Oral, Daily, Osceola Elenita, CRNP, 145 mg at 01/24/21 0903    nicotine (NICODERM CQ) 14 mg/24hr TD 24 hr patch 1 patch, 1 patch, Transdermal, Daily, Isaiah Elenita, CRNP    nitroglycerin (NITROSTAT) SL tablet 0 4 mg, 0 4 mg, Sublingual, Q5 Min PRN, Osceola Elenita, CRNP, 0 4 mg at 01/23/21 1924    VTE Pharmacologic Prophylaxis:   Lovenox    Objective:   Vitals: Blood pressure 115/63, pulse (!) 53, temperature 97 7 °F (36 5 °C), temperature source Oral, resp  rate 17, height 5' 10" (1 778 m), weight 84 4 kg (186 lb), SpO2 97 %  Body mass index is 26 69 kg/m²    Wt Readings from Last 3 Encounters:   01/23/21 84 4 kg (186 lb)   05/24/18 82 6 kg (182 lb)   08/05/14 79 4 kg (175 lb)     BP Readings from Last 3 Encounters:   01/24/21 115/63   05/24/18 141/84   08/05/14 124/82     Orthostatic Blood Pressures      Most Recent Value   Blood Pressure  115/63 filed at 01/24/2021 0750   Patient Position - Orthostatic VS  Lying filed at 01/24/2021 0750          Intake/Output Summary (Last 24 hours) at 1/24/2021 1124  Last data filed at 1/24/2021 1001  Gross per 24 hour   Intake 400 ml   Output    Net 400 ml       Invasive Devices     Peripheral Intravenous Line            Peripheral IV 01/23/21 Right Antecubital less than 1 day                  Physical Exam: Physical Exam    Neurologic:  Alert & oriented x 3, no new focal deficits, Not in any acute distress,  Constitutional:  Well developed, well nourished, non-toxic appearance   Eyes:  Pupil equal and reacting to light, conjunctiva normal   HENT:  Atraumatic, oropharynx moist, Neck- normal range of motion, no tenderness, supple   Respiratory:  Bilateral air entry, mostly clear to auscultation , with prolonged expiratory phase  Cardiovascular: S1-S2 regular with no gallops no murmur  GI:  Soft, nondistended, normal bowel sounds, nontender, no hepatosplenomegaly appreciated  Musculoskeletal:  No edema, no tenderness, no deformities     Skin:  Well hydrated, no rash   Lymphatic:  No lymphadenopathy noted   Extremities:  No edema    Labs:   Troponins:   Results from last 7 days   Lab Units 01/24/21 0448 01/23/21  2224 01/23/21  1903   TROPONIN I ng/mL <0 02 <0 02 <0 02       CBC with diff:   Results from last 7 days   Lab Units 01/24/21  0448 01/23/21  1903   WBC Thousand/uL 8 18 17 10*   HEMOGLOBIN g/dL 14 6 15 6   HEMATOCRIT % 44 6 46 5   MCV fL 92 89   PLATELETS Thousands/uL 267 291   MCH pg 30 0 29 8   MCHC g/dL 32 7 33 5   RDW % 12 0 11 8   MPV fL 9 4 8 9   NRBC AUTO /100 WBCs 0 0       CMP:   Results from last 7 days   Lab Units 01/24/21  0448 01/23/21  1903   SODIUM mmol/L 137 134*   POTASSIUM mmol/L 4 0 3 2*   CHLORIDE mmol/L 102 98*   CO2 mmol/L 26 27   ANION GAP mmol/L 9 9   BUN mg/dL 12 17   CREATININE mg/dL 0 97 1 00   GLUCOSE FASTING mg/dL 106*  --    CALCIUM mg/dL 8 4 9 0   AST U/L 16 19   ALT U/L 30 34   ALK PHOS U/L 61 70   TOTAL PROTEIN g/dL 7 2 8 0   ALBUMIN g/dL 3 8 4 4   TOTAL BILIRUBIN mg/dL 1 00 1 10*   EGFR ml/min/1 73sq m 103 99   GLUCOSE RANDOM mg/dL 106 127     Coags:   Results from last 7 days   Lab Units 01/23/21  1904   PTT seconds 30   INR  1 01     TSH:      No components found for: TSH3  Lipid Profile:   Results from last 7 days   Lab Units 01/24/21  0448   CHOLESTEROL mg/dL 218* TRIGLYCERIDES mg/dL 185*   HDL mg/dL 38*   LDL CALC mg/dL 143*         Imaging & Testing   Cardiac testing:     EKG/ Monitor: Personally reviewed  Normal sinus rhythm with T-wave inversions in V4 to V6  Repeat EKG done today shows T-waves have normalized  Currently sinus rhythm heart rate 55 beats per minute     Code Status: Level 1 - Full Code  Advance Directive and Living Will:      POLST:        Soumya Damon MD MD, [facility] [place]      "This note has been constructed using a voice recognition system  Therefore there may be syntax, spelling, and/or grammatical errors   Please call if you have any questions  "

## 2021-01-24 NOTE — UTILIZATION REVIEW
Initial Clinical Review    Admission: Date/Time/Statement:   Admission Orders (From admission, onward)     Ordered        01/23/21 1949  Place in Observation  Once                Orders Placed This Encounter   Procedures    Place in Observation     Standing Status:   Standing     Number of Occurrences:   1     Order Specific Question:   Level of Care     Answer:   Med Surg [16]     ED Arrival Information     Expected Arrival Ac[employer] Service Admission Type    - 1/23/2021 18:50 Emergent General Medicine Emergency    Arrival Complaint    Tightness in Chest      Chief Complaint   Patient presents with    Chest Pain     pt states started about 1 5 hrs ago with pain in center of chest through to right shoulder blade  Pain slightly increases with movement of right arm  Denies difficulty breathing     Assessment/Plan:  26year old male with PMHx HLD, tobacco dependence, polysubstance abuse with recent Cocaine use and marijuana use  Presented emergently to [facility] ED on 1/23/21 2nd right sided chest pain/ tightness radiating into the right shoulder blade after getting out of the shower - pain associated worse with deep inspiration and associated with some diaphoresis  In ED - Temp 97 1  HR 88  /85   Rm Air SpO2 97 %   12 Lead EKG showed T wave inversions in 1, AVL +  V4-V6  Labs"  WBC 17, 100 K+ 3 2  Troponin neg D-Dimer 0 38  ED Tx:  NTG sl x 2 with relief of chest pain, ASA: Lovenox, K-Dur  Per Cardiology recommendation - Placed in Observation 1/23/21 at 1949 2nd Chest Pain with EKG changes - Plan: serial EKGs +  Troponin,  continue aspirin, statin and Cardiology consultation -    1/24/21 Cardiology  EKG/ Monitor: reviewed  Normal sinus rhythm with T-wave inversions in V4 to V6  Repeat EKG done today shows T-waves have normalized  Currently sinus rhythm heart rate 55 beats per minute    Assessment & Plan   1  Chest pain with ACS ruled out    Patient has episodes of chest pain which were relieved with nitro and had EKG changes  He needs a nuclear stress test   EKG has normalized  His father had history of premature coronary artery disease and needed valve surgery    2  Dyslipidemia, but was not taking any medications    3  History of polysubstance abuse with recent Cocaine use and marijuana use  Discussed with patient that cocaine use can cause accelerated coronary artery disease and he will need a nuclear stress test and nuclear stress test   4  Continues tobacco abuse since his 13year-old smokes about 1 pack to 2 packs a day   5  Abnormal EKG with T-wave inversions in lateral precordial leads could be due to spasm in the arteries certainly as they have now normalized  ED Triage Vitals   Temperature Pulse Respirations Blood Pressure SpO2   01/23/21 1900 01/23/21 1900 01/23/21 1900 01/23/21 1900 01/23/21 1900   (!) 97 1 °F (36 2 °C) 88 16 155/85 97 %      Temp Source Heart Rate Source Patient Position - Orthostatic VS BP Location FiO2 (%)   01/23/21 1900 01/24/21 0750 01/23/21 1900 01/23/21 1900 --   Tympanic Monitor Lying Left arm       Pain Score       01/23/21 1857       7          Wt Readings from Last 1 Encounters:   01/23/21 84 4 kg (186 lb)     Additional Vital Signs:   01/24/21 07:50:21  97 7 °F (36 5 °C)  53Abnormal   17  115/63  80  97 %  None (Room air)  Lying   01/24/21 04:44:24  97 8 °F (36 6 °C)  61  18  114/62  79  98 %       01/24/21 01:08:31  97 5 °F (36 4 °C)  70  18  139/72  94  98 %       01/23/21 20:34:34  97 3 °F (36 3 °C)Abnormal   70  18  151/81  104  97 %       01/23/21 1945    102  14  142/80  105  97 %         I/O 01/24 0701   01/25 0700   P  O  400   Total Intake(mL/kg) 400 (4 7)   Net +400     Pertinent Labs/Diagnostic Test Results:     Results from last 7 days   Lab Units 01/24/21  0448 01/23/21  1903   WBC Thousand/uL 8 18 17 10*   HEMOGLOBIN g/dL 14 6 15 6   HEMATOCRIT % 44 6 46 5   PLATELETS Thousands/uL 267 291   NEUTROS ABS Thousands/µL 3 57 13 26* Results from last 7 days   Lab Units 01/24/21  0448 01/23/21  1903   SODIUM mmol/L 137 134*   POTASSIUM mmol/L 4 0 3 2*   CHLORIDE mmol/L 102 98*   CO2 mmol/L 26 27   ANION GAP mmol/L 9 9   BUN mg/dL 12 17   CREATININE mg/dL 0 97 1 00   EGFR ml/min/1 73sq m 103 99   CALCIUM mg/dL 8 4 9 0     Results from last 7 days   Lab Units 01/24/21  0448 01/23/21  1903   AST U/L 16 19   ALT U/L 30 34   ALK PHOS U/L 61 70   TOTAL PROTEIN g/dL 7 2 8 0   ALBUMIN g/dL 3 8 4 4   TOTAL BILIRUBIN mg/dL 1 00 1 10*       Results from last 7 days   Lab Units 01/24/21  0448 01/23/21  1903   GLUCOSE RANDOM mg/dL 106 127     Results from last 7 days   Lab Units 01/24/21  0448 01/23/21  2224 01/23/21  1903   TROPONIN I ng/mL <0 02 <0 02 <0 02     Results from last 7 days   Lab Units 01/23/21  1904   D-DIMER QUANTITATIVE ug/ml FEU 0 38     Results from last 7 days   Lab Units 01/23/21  1904   PROTIME seconds 13 2   INR  1 01   PTT seconds 30        12 LEAD EKG  Rhythm: sinus rhythm    Ectopy: none    QRS axis:  Normal  Conduction: normal    ST segments:  Non-specific  T waves: inverted I, aVL, V4, V5 and V6     ED Treatment:   Medication Administration from 01/23/2021 1850 to 01/23/2021 2024       Date/Time Order Dose Route Action     01/23/2021 1910 aspirin chewable tablet 324 mg 324 mg Oral Given     01/23/2021 1924 nitroglycerin (NITROSTAT) SL tablet 0 4 mg 0 4 mg Sublingual Given     01/23/2021 1912 nitroglycerin (NITROSTAT) SL tablet 0 4 mg 0 4 mg Sublingual Given     01/23/2021 1943 potassium chloride (K-DUR,KLOR-CON) CR tablet 40 mEq 40 mEq Oral Given     01/23/2021 2006 enoxaparin (LOVENOX) subcutaneous injection 80 mg 80 mg Subcutaneous Given     Past Medical History:   Diagnosis Date    Hyperlipidemia      Present on Admission:   Chest pain    Admitting Diagnosis: Chest pain [R07 9]  Abnormal EKG [R94 31]    Age/Sex: 26 y o  male    Admission Orders:  Telemetry  ST Segment Monitoring  VS q4hrs  Continuous Pulse Oximetry  Up + OOB as tolerated  Diet Cardiovascular; Cardiac; No Chocolate; No Caffeine  I+O q shift  Repeat EKG in am  Serial Troponin q3hrs x 3    Scheduled Medications:  aspirin, 81 mg, Oral, Daily  enoxaparin, 1 mg/kg, Subcutaneous, Q12H JEAN PIERRE  fenofibrate, 145 mg, Oral, Daily  nicotine, 1 patch, Transdermal, Daily     PRN Meds:  nitroglycerin, 0 4 mg, Sublingual, Q5 Min PRN    IP CONSULT TO CARDIOLOGY    Network Utilization Review Department  ATTENTION: Please call with any questions or concerns to [phone] and carefully listen to the prompts so that you are directed to the right person  All voicemails are confidential   Hal Coello all requests for admission clinical reviews, approved or denied determinations and any other requests to dedicated fax number below belonging to the campus where the patient is receiving treatment   List of dedicated fax numbers for the Facilities:  [address] DENIALS (Administrative/Medical Necessity) [phone]   [address] (Maternity/NICU/Pediatrics) [phone]   [address] [place] [address] [address] [place] [address] (  Murali Sherman "[address]ul[address]" [address]) [address] [place] [address] [address] [place] [address] [phone]

## 2021-01-24 NOTE — PROGRESS NOTES
Progress Note - Nona Barber 1990, 26 y o  male MRN: [id number]    Unit/Bed#: [address] [address] Encounter: [id number]    Primary Care Provider: Arash Parrish MD   Date and time admitted to hospital: 1/23/2021  6:55 PM        * Chest pain  Assessment & Plan  Patient presented to the ED with complaints of right sided chest pain radiating into the right shoulder blade after getting out of the shower  It felt like a tightness  It is worse with deep inspiration associated with some diaphoresis  No nausea, vomiting  Pain improved with nitro sublingual in the ER, now resolved  EKG revealed t wave inversions in V4-V6  · Serial troponins were negative  · Continue aspirin, statin  · Cardiology consultation - recs received in ER for lovenox and AM consult  · Lipid panel showed total cholesterol level 218 and LDL level of 143  · Repeat EKG showed T inversions in the lateral leads  · Patient did have history of cocaine use  · Patient's AAYUSH score was 2  · Patient is scheduled for nuclear stress test in a m  Leukocytosis  Assessment & Plan  No fevers, chills, cough  D-dimer is negative  Chest x-ray showed no active disease      Mixed hyperlipidemia  Assessment & Plan  Lipid panel showed total cholesterol level of 218, LDL level of 143  Patient might need statin    Hypokalemiaresolved as of 1/24/2021  Assessment & Plan  Resolved with repletion    VTE Pharmacologic Prophylaxis:   Pharmacologic: Enoxaparin (Lovenox)  Mechanical VTE Prophylaxis in Place: Yes    Patient Centered Rounds: I have performed bedside rounds with nursing staff today  Discussions with Specialists or Other Care Team Provider: Dr Checo Gentile    Education and Discussions with Family / Patient: yes    Time Spent for Care: 30 minutes  More than 50% of total time spent on counseling and coordination of care as described above      Current Length of Stay: 0 day(s)    Current Patient Status: Observation   Certification Statement: The patient will continue to require additional inpatient hospital stay due to Chest pain relieved with nitroglycerin for stress test    Discharge Plan:  Home    Code Status: Level 1 - Full Code      Subjective:   Patient is still complaining of some right-sided chest pain under the right shoulder blade close to the spine  No further chest tightness  Denies any shortness of breath or palpitations  Objective:     Vitals:   Temp (24hrs), Av 5 °F (36 4 °C), Min:97 1 °F (36 2 °C), Max:97 8 °F (36 6 °C)    Temp:  [97 1 °F (36 2 °C)-97 8 °F (36 6 °C)] 97 7 °F (36 5 °C)  HR:  [] 53  Resp:  [14-18] 17  BP: (114-155)/(62-85) 115/63  SpO2:  [96 %-98 %] 97 %  Body mass index is 26 69 kg/m²  Input and Output Summary (last 24 hours): Intake/Output Summary (Last 24 hours) at 2021 1319  Last data filed at 2021 1001  Gross per 24 hour   Intake 400 ml   Output    Net 400 ml       Physical Exam:     Physical Exam  Constitutional:       General: He is not in acute distress  HENT:      Head: Normocephalic and atraumatic  Eyes:      Conjunctiva/sclera: Conjunctivae normal       Pupils: Pupils are equal, round, and reactive to light  Neck:      Musculoskeletal: Normal range of motion and neck supple  Cardiovascular:      Rate and Rhythm: Normal rate and regular rhythm  Heart sounds: Normal heart sounds  Pulmonary:      Effort: Pulmonary effort is normal  No respiratory distress  Breath sounds: No wheezing, rhonchi or rales  Chest:      Chest wall: No tenderness  Abdominal:      General: Bowel sounds are normal  There is no distension  Palpations: Abdomen is soft  Tenderness: There is no abdominal tenderness  There is no guarding or rebound  Skin:     General: Skin is warm and dry  Findings: No rash  Neurological:      Mental Status: He is alert  Cranial Nerves: No cranial nerve deficit           Additional Data:     Labs:    Results from last 7 days   Lab Units 01/24/21  0448   WBC Thousand/uL 8 18   HEMOGLOBIN g/dL 14 6   HEMATOCRIT % 44 6   PLATELETS Thousands/uL 267   NEUTROS PCT % 44   LYMPHS PCT % 40   MONOS PCT % 12   EOS PCT % 4     Results from last 7 days   Lab Units 01/24/21  0448   POTASSIUM mmol/L 4 0   CHLORIDE mmol/L 102   CO2 mmol/L 26   BUN mg/dL 12   CREATININE mg/dL 0 97   CALCIUM mg/dL 8 4   ALK PHOS U/L 61   ALT U/L 30   AST U/L 16     Results from last 7 days   Lab Units 01/23/21  1904   INR  1 01       * I Have Reviewed All Lab Data Listed Above  * Additional Pertinent Lab Tests Reviewed: All [facility] Admission Reviewed        Recent Cultures (last 7 days):           Last 24 Hours Medication List:   Current Facility-Administered Medications   Medication Dose Route Frequency Provider Last Rate    aspirin  81 mg Oral Daily SUJIT Motta      enoxaparin  1 mg/kg Subcutaneous Q12H [facility] SUJIT Motta      fenofibrate  145 mg Oral Daily SUJIT Motta      nicotine  1 patch Transdermal Daily SUJIT Motta      nitroglycerin  0 4 mg Sublingual Q5 Min PRN SUJIT Motta          Today, Patient Was Seen By: Trey Prabhakar MD    ** Please Note: Dictation voice to text software may have been used in the creation of this document   **

## 2021-01-25 ENCOUNTER — APPOINTMENT (OUTPATIENT)
Dept: RADIOLOGY | Facility: HOSPITAL | Age: 31
End: 2021-01-25
Payer: COMMERCIAL

## 2021-01-25 ENCOUNTER — APPOINTMENT (OUTPATIENT)
Dept: NON INVASIVE DIAGNOSTICS | Facility: HOSPITAL | Age: 31
End: 2021-01-25
Payer: COMMERCIAL

## 2021-01-25 VITALS
HEIGHT: 70 IN | WEIGHT: 187.83 LBS | DIASTOLIC BLOOD PRESSURE: 76 MMHG | BODY MASS INDEX: 26.89 KG/M2 | HEART RATE: 64 BPM | OXYGEN SATURATION: 98 % | SYSTOLIC BLOOD PRESSURE: 138 MMHG | RESPIRATION RATE: 18 BRPM | TEMPERATURE: 98.3 F

## 2021-01-25 PROBLEM — F19.10 DRUG ABUSE (HCC): Status: ACTIVE | Noted: 2021-01-25

## 2021-01-25 LAB
ATRIAL RATE: 87 BPM
ATRIAL RATE: 94 BPM
CHEST PAIN STATEMENT: NORMAL
MAX DIASTOLIC BP: 90 MMHG
MAX HEART RATE: 176 BPM
MAX PREDICTED HEART RATE: 188 BPM
MAX. SYSTOLIC BP: 200 MMHG
P AXIS: 51 DEGREES
P AXIS: 54 DEGREES
PR INTERVAL: 140 MS
PR INTERVAL: 140 MS
PROTOCOL NAME: NORMAL
QRS AXIS: 6 DEGREES
QRS AXIS: 7 DEGREES
QRSD INTERVAL: 88 MS
QRSD INTERVAL: 94 MS
QT INTERVAL: 352 MS
QT INTERVAL: 362 MS
QTC INTERVAL: 435 MS
QTC INTERVAL: 440 MS
REASON FOR TERMINATION: NORMAL
T WAVE AXIS: 112 DEGREES
T WAVE AXIS: 114 DEGREES
TARGET HR FORMULA: NORMAL
TIME IN EXERCISE PHASE: NORMAL
VENTRICULAR RATE: 87 BPM
VENTRICULAR RATE: 94 BPM

## 2021-01-25 PROCEDURE — A9502 TC99M TETROFOSMIN: HCPCS

## 2021-01-25 PROCEDURE — 99217 PR OBSERVATION CARE DISCHARGE MANAGEMENT: CPT | Performed by: INTERNAL MEDICINE

## 2021-01-25 PROCEDURE — 93018 CV STRESS TEST I&R ONLY: CPT | Performed by: INTERNAL MEDICINE

## 2021-01-25 PROCEDURE — 93016 CV STRESS TEST SUPVJ ONLY: CPT | Performed by: INTERNAL MEDICINE

## 2021-01-25 PROCEDURE — G1004 CDSM NDSC: HCPCS

## 2021-01-25 PROCEDURE — 78452 HT MUSCLE IMAGE SPECT MULT: CPT | Performed by: INTERNAL MEDICINE

## 2021-01-25 PROCEDURE — 99214 OFFICE O/P EST MOD 30 MIN: CPT | Performed by: INTERNAL MEDICINE

## 2021-01-25 PROCEDURE — 93017 CV STRESS TEST TRACING ONLY: CPT

## 2021-01-25 PROCEDURE — 78452 HT MUSCLE IMAGE SPECT MULT: CPT

## 2021-01-25 PROCEDURE — 93010 ELECTROCARDIOGRAM REPORT: CPT | Performed by: INTERNAL MEDICINE

## 2021-01-25 RX ORDER — NICOTINE 21 MG/24HR
1 PATCH, TRANSDERMAL 24 HOURS TRANSDERMAL DAILY
Qty: 28 PATCH | Refills: 0 | Status: SHIPPED | OUTPATIENT
Start: 2021-01-26

## 2021-01-25 RX ORDER — ATORVASTATIN CALCIUM 20 MG/1
20 TABLET, FILM COATED ORAL
Qty: 30 TABLET | Refills: 0 | Status: SHIPPED | OUTPATIENT
Start: 2021-01-25

## 2021-01-25 RX ORDER — FENOFIBRATE 145 MG/1
145 TABLET, COATED ORAL DAILY
Qty: 30 TABLET | Refills: 0 | Status: SHIPPED | OUTPATIENT
Start: 2021-01-25

## 2021-01-25 RX ADMIN — Medication 1 PATCH: at 08:41

## 2021-01-25 RX ADMIN — FENOFIBRATE 145 MG: 145 TABLET ORAL at 08:41

## 2021-01-25 RX ADMIN — ASPIRIN 81 MG CHEWABLE TABLET 81 MG: 81 TABLET CHEWABLE at 08:41

## 2021-01-25 NOTE — ASSESSMENT & PLAN NOTE
Patient's urine drug screen was positive for THC and cocaine  Advised on abstaining from illicit drugs

## 2021-01-25 NOTE — ASSESSMENT & PLAN NOTE
Lipid panel showed total cholesterol level of 218, LDL level of 143  Added on Lipitor 20 milligram p o   Daily  Continue fenofibrate

## 2021-01-25 NOTE — PROGRESS NOTES
Progress Note - Cardiology   [facility]     Nona Barber 26 y o  male MRN: [id number]  : 1990  Unit/Bed#: [address] [facility] 415-01 Encounter: [id number]    Assessment and Plan:   1  Chest pain:  Patient with T-wave inversion seen in lateral leads on admission EKG, normalized with resolution of discomfort  -  patient scheduled for Lexiscan nuclear stress test    -  patient with history of polysubstance abuse in addition to family history of premature coronary artery disease     -  was discussed with patient that use of cocaine can accelerate coronary artery disease    -  encourage patient to quit    2  Tobacco abuse:  Encouraged smoking cessation    3  Dyslipidemia:  Patient started on low-dose statin and Tricor    Subjective / Objective:   Patient seen and examined  No cardiac complaints offered  He scheduled for Lexiscan nuclear stress test for evaluation of some ischemia  Vitals: Blood pressure 140/76, pulse 64, temperature 98 3 °F (36 8 °C), temperature source Oral, resp  rate 18, height 5' 10" (1 778 m), weight 85 2 kg (187 lb 13 3 oz), SpO2 98 %  Vitals:    21 2100   Weight: 84 4 kg (186 lb) 85 2 kg (187 lb 13 3 oz)     Body mass index is 26 95 kg/m²  BP Readings from Last 3 Encounters:   21 140/76   18 141/84   14 124/82     Orthostatic Blood Pressures      Most Recent Value   Blood Pressure  140/76 filed at 2021 0840   Patient Position - Orthostatic VS  Lying filed at 2021 0840        I/O        0701 -  0700  0701 -  0700  07 -  0700    P  O   1300     Total Intake(mL/kg)  1300 (15 3)     Net  +1300                Invasive Devices     Peripheral Intravenous Line            Peripheral IV 21 Right Antecubital 1 day                  Intake/Output Summary (Last 24 hours) at 2021 1029  Last data filed at 2021 1730  Gross per 24 hour   Intake 900 ml   Output    Net 900 ml Physical Exam:   Physical Exam  Vitals signs and nursing note reviewed  Constitutional:       General: He is not in acute distress  Appearance: Normal appearance  He is well-developed and normal weight  HENT:      Head: Normocephalic  Right Ear: External ear normal       Left Ear: External ear normal       Nose: Nose normal    Eyes:      General: No scleral icterus  Right eye: No discharge  Left eye: No discharge  Conjunctiva/sclera: Conjunctivae normal       Pupils: Pupils are equal, round, and reactive to light  Neck:      Musculoskeletal: Normal range of motion and neck supple  Thyroid: No thyromegaly  Cardiovascular:      Rate and Rhythm: Normal rate and regular rhythm  Heart sounds: No murmur  Pulmonary:      Effort: Pulmonary effort is normal  No respiratory distress  Breath sounds: No wheezing, rhonchi or rales  Abdominal:      General: Bowel sounds are normal  There is no distension  Palpations: Abdomen is soft  Musculoskeletal:      Right lower leg: No edema  Left lower leg: No edema  Skin:     General: Skin is warm and dry  Capillary Refill: Capillary refill takes less than 2 seconds  Neurological:      General: No focal deficit present  Mental Status: He is alert and oriented to person, place, and time  Mental status is at baseline  Psychiatric:         Mood and Affect: Mood normal          Behavior: Behavior normal          Thought Content:  Thought content normal          Judgment: Judgment normal                    Medications/ Allergies:     Current Facility-Administered Medications   Medication Dose Route Frequency Provider Last Rate    aspirin  81 mg Oral Daily Ibarra Ala, CRNP      atorvastatin  20 mg Oral Daily With Yosef Evans MD      enoxaparin  1 mg/kg Subcutaneous Q12H [address] Ibarra Ala, CRNP      fenofibrate  145 mg Oral Daily Ibarra Ala, CRNP      nicotine  1 patch Transdermal Daily Sera Hoof, CRNP      nitroglycerin  0 4 mg Sublingual Q5 Min PRN Sera Hoof, CRNP       nitroglycerin, 0 4 mg, Q5 Min PRN      No Known Allergies    VTE Pharmacologic Prophylaxis:   Sequential compression device (Venodyne)     Labs:   Troponins:  Results from last 7 days   Lab Units 01/24/21  0448 01/23/21  2224 01/23/21  1903   TROPONIN I ng/mL <0 02 <0 02 <0 02     CBC with diff:  Results from last 7 days   Lab Units 01/24/21  0448 01/23/21  1903   WBC Thousand/uL 8 18 17 10*   HEMOGLOBIN g/dL 14 6 15 6   HEMATOCRIT % 44 6 46 5   MCV fL 92 89   PLATELETS Thousands/uL 267 291   MCH pg 30 0 29 8   MCHC g/dL 32 7 33 5   RDW % 12 0 11 8   MPV fL 9 4 8 9   NRBC AUTO /100 WBCs 0 0     CMP:  Results from last 7 days   Lab Units 01/24/21  0448 01/23/21  1903   SODIUM mmol/L 137 134*   POTASSIUM mmol/L 4 0 3 2*   CHLORIDE mmol/L 102 98*   CO2 mmol/L 26 27   ANION GAP mmol/L 9 9   BUN mg/dL 12 17   CREATININE mg/dL 0 97 1 00   GLUCOSE FASTING mg/dL 106*  --    CALCIUM mg/dL 8 4 9 0   AST U/L 16 19   ALT U/L 30 34   ALK PHOS U/L 61 70   TOTAL PROTEIN g/dL 7 2 8 0   ALBUMIN g/dL 3 8 4 4   TOTAL BILIRUBIN mg/dL 1 00 1 10*   EGFR ml/min/1 73sq m 103 99     Coags:  Results from last 7 days   Lab Units 01/23/21  1904   PTT seconds 30   INR  1 01     Lipid Profile:  Results from last 7 days   Lab Units 01/24/21  0448   CHOLESTEROL mg/dL 218*   TRIGLYCERIDES mg/dL 185*   HDL mg/dL 38*   LDL CALC mg/dL 143*     Hgb A1c:  Results from last 7 days   Lab Units 01/24/21  0448   HEMOGLOBIN A1C % 5 1       Imaging & Testing   I have personally reviewed pertinent reports  Xr Chest 1 View Portable    Result Date: 1/24/2021  Narrative: CHEST INDICATION:   chest pain  COMPARISON:  None EXAM PERFORMED/VIEWS:  XR CHEST PORTABLE  AP semierect FINDINGS: Cardiomediastinal silhouette appears unremarkable  The lungs are clear  No pneumothorax or pleural effusion   Osseous structures appear within normal limits for patient age  Impression: No acute cardiopulmonary disease  Workstation performed: [id number]        EKG / Monitor: Personally reviewed  Sinus rhythm    Cardiac testing:   Lexiscan nuclear stress test pending          Ping [place]        "This note has been constructed using a voice recognition system  Therefore there may be syntax, spelling, and/or grammatical errors   Please call if you have any questions  "

## 2021-01-25 NOTE — DISCHARGE SUMMARY
Discharge- Taylor Santamaria 1990, 26 y o  male MRN: [id number]    Unit/Bed#: [address]79 [place] [address] Encounter: [id number]    Primary Care Provider: Osmar Broderikc MD   Date and time admitted to hospital: 1/23/2021  6:55 PM        * Chest pain  Assessment & Plan  Patient presented to the ED with complaints of right sided chest pain radiating into the right shoulder blade after getting out of the shower  It felt like a tightness  It is worse with deep inspiration associated with some diaphoresis  No nausea, vomiting  Pain improved with nitro sublingual in the ER, now resolved  EKG revealed t wave inversions in V4-V6  · Serial troponins were negative  · Continue aspirin, statin  · Cardiology consultation - recs received in ER for lovenox and AM consult  · Lipid panel showed total cholesterol level 218 and LDL level of 143  · Repeat EKG showed T inversions in the lateral leads  · Patient did have history of cocaine use  · Patient's AAYUSH score was 2  · Patient underwent nuclear stress test which showed no evidence of ischemia with normal EF  · Patient to be discharged on statin  · Advised to abstain from drug use    Drug abuse [facility])  Assessment & Plan  Patient's urine drug screen was positive for THC and cocaine  Advised on abstaining from illicit drugs    Leukocytosis  Assessment & Plan  No fevers, chills, cough  D-dimer is negative  Chest x-ray showed no active disease      Mixed hyperlipidemia  Assessment & Plan  Lipid panel showed total cholesterol level of 218, LDL level of 143  Added on Lipitor 20 milligram p o   Daily  Continue fenofibrate      Discharging Physician / Practitioner: Keila Neal MD  PCP: Osmar Broderick MD  Admission Date:   Admission Orders (From admission, onward)     Ordered        01/23/21 1949  Place in Observation  Once                   Discharge Date: 01/25/21    Resolved Problems  Date Reviewed: 1/25/2021          Resolved    Hypokalemia 1/24/2021     Resolved by  Keila Neal MD          Consultations During Hospital Stay:  · Cardiology    Procedures Performed:   · Nuclear stress test showed no evidence of ischemia with normal EF as per preliminary report        Outpatient Tests Requested:  · Follow-up with Cardiology    Complications:  None    Reason for Admission:  Chest pain    Hospital Course:     Daina Song is a 26 y o  male patient with past medical history of tobacco dependence, hyperlipidemia who originally presented to the hospital on 1/23/2021 due to a chest pain across his chest and also some sharp right shoulder pain radiating into the right side of the neck  Patient noted to have some T-wave inversions on the EK G  Patient's troponin was slightly elevated  Patient was seen by Cardiology  Later it was noted that patient did use cocaine before the episode  Patient later underwent nuclear stress test which showed no evidence of ischemia and normal EF  Prolonged discussion held with the patient regarding abstaining from illicit drug abuse  Patient be discharged home with outpatient follow-up with PCP and Cardiology  Please see above list of diagnoses and related plan for additional information  Condition at Discharge: stable     Discharge Day Visit / Exam:     Subjective:  Patient denies any chest pain, shortness of breath, headache, dizziness, palpitations  Vitals: Blood Pressure: 138/76 (01/25/21 1235)  Pulse: 64 (01/25/21 0840)  Temperature: 98 3 °F (36 8 °C) (01/25/21 0840)  Temp Source: Oral (01/25/21 0840)  Respirations: 18 (01/25/21 0840)  Height: 5' 10" (177 8 cm) (01/24/21 2100)  Weight - Scale: 85 2 kg (187 lb 13 3 oz) (01/24/21 2100)  SpO2: 98 % (01/25/21 0840)  Exam:   Physical Exam  Constitutional:       Appearance: Normal appearance  HENT:      Head: Normocephalic and atraumatic  Eyes:      Extraocular Movements: Extraocular movements intact  Pupils: Pupils are equal, round, and reactive to light     Neck:      Musculoskeletal: Normal range of motion and neck supple  Cardiovascular:      Rate and Rhythm: Normal rate and regular rhythm  Heart sounds: No murmur  No gallop  Pulmonary:      Effort: Pulmonary effort is normal       Breath sounds: Normal breath sounds  Abdominal:      General: Bowel sounds are normal       Palpations: Abdomen is soft  Tenderness: There is no abdominal tenderness  Musculoskeletal: Normal range of motion  General: No swelling or deformity  Skin:     General: Skin is warm and dry  Neurological:      General: No focal deficit present  Mental Status: He is alert  Discharge instructions/Information to patient and family:   See after visit summary for information provided to patient and family  Provisions for Follow-Up Care:  See after visit summary for information related to follow-up care and any pertinent home health orders  Disposition:     Home      Planned Readmission: No     Discharge Statement:  I spent 25minutes discharging the patient  This time was spent on the day of discharge  I had direct contact with the patient on the day of discharge  Greater than 50% of the total time was spent examining patient, answering all patient questions, arranging and discussing plan of care with patient as well as directly providing post-discharge instructions  Additional time then spent on discharge activities  Discharge Medications:  See after visit summary for reconciled discharge medications provided to patient and family        ** Please Note: This note has been constructed using a voice recognition system **

## 2021-01-25 NOTE — NURSING NOTE
Patient discharged to home  Prescriptions and Discharge instructions were given and reviewed with patient  All questions answered  IV was removed per policy and procedure  Pt tolerated this well without complaint  Occlusive dressing was applied to the site  Patient left the unit with all belongings and a firm understanding of discharge instructions

## 2021-01-25 NOTE — DISCHARGE INSTRUCTIONS

## 2021-01-25 NOTE — ASSESSMENT & PLAN NOTE
Patient presented to the ED with complaints of right sided chest pain radiating into the right shoulder blade after getting out of the shower  It felt like a tightness  It is worse with deep inspiration associated with some diaphoresis  No nausea, vomiting  Pain improved with nitro sublingual in the ER, now resolved    EKG revealed t wave inversions in V4-V6  · Serial troponins were negative  · Continue aspirin, statin  · Cardiology consultation - recs received in ER for lovenox and AM consult  · Lipid panel showed total cholesterol level 218 and LDL level of 143  · Repeat EKG showed T inversions in the lateral leads  · Patient did have history of cocaine use  · Patient's AAYUSH score was 2  · Patient underwent nuclear stress test which showed no evidence of ischemia with normal EF  · Patient to be discharged on statin  · Advised to abstain from drug use

## 2021-01-28 LAB
ATRIAL RATE: 55 BPM
P AXIS: -8 DEGREES
PR INTERVAL: 126 MS
QRS AXIS: 25 DEGREES
QRSD INTERVAL: 96 MS
QT INTERVAL: 428 MS
QTC INTERVAL: 409 MS
T WAVE AXIS: 42 DEGREES
VENTRICULAR RATE: 55 BPM

## 2021-01-28 PROCEDURE — 93010 ELECTROCARDIOGRAM REPORT: CPT | Performed by: INTERNAL MEDICINE

## 2022-06-10 ENCOUNTER — HOSPITAL ENCOUNTER (EMERGENCY)
Facility: HOSPITAL | Age: 32
End: 2022-06-10
Attending: EMERGENCY MEDICINE | Admitting: EMERGENCY MEDICINE
Payer: COMMERCIAL

## 2022-06-10 ENCOUNTER — HOSPITAL ENCOUNTER (EMERGENCY)
Facility: HOSPITAL | Age: 32
Discharge: HOME | End: 2022-06-10
Attending: UROLOGY
Payer: COMMERCIAL

## 2022-06-10 VITALS
OXYGEN SATURATION: 100 % | DIASTOLIC BLOOD PRESSURE: 87 MMHG | SYSTOLIC BLOOD PRESSURE: 143 MMHG | BODY MASS INDEX: 27.35 KG/M2 | HEIGHT: 70 IN | RESPIRATION RATE: 16 BRPM | HEART RATE: 77 BPM | TEMPERATURE: 98.3 F | WEIGHT: 191 LBS

## 2022-06-10 VITALS
OXYGEN SATURATION: 98 % | WEIGHT: 190.6 LBS | HEART RATE: 62 BPM | SYSTOLIC BLOOD PRESSURE: 145 MMHG | BODY MASS INDEX: 27.35 KG/M2 | DIASTOLIC BLOOD PRESSURE: 62 MMHG | TEMPERATURE: 97.5 F | RESPIRATION RATE: 18 BRPM

## 2022-06-10 DIAGNOSIS — N48.30 PRIAPISM: Primary | ICD-10-CM

## 2022-06-10 LAB
AMPHETAMINES SERPL QL SCN: NEGATIVE
ANION GAP SERPL CALCULATED.3IONS-SCNC: 10 MMOL/L (ref 4–13)
BARBITURATES UR QL: NEGATIVE
BASOPHILS # BLD AUTO: 0.04 THOUSANDS/ΜL (ref 0–0.1)
BASOPHILS NFR BLD AUTO: 0 % (ref 0–1)
BENZODIAZ UR QL: NEGATIVE
BUN SERPL-MCNC: 19 MG/DL (ref 5–25)
CALCIUM SERPL-MCNC: 9.1 MG/DL (ref 8.3–10.1)
CHLORIDE SERPL-SCNC: 101 MMOL/L (ref 100–108)
CO2 SERPL-SCNC: 26 MMOL/L (ref 21–32)
COCAINE UR QL: POSITIVE
CREAT SERPL-MCNC: 0.97 MG/DL (ref 0.6–1.3)
EOSINOPHIL # BLD AUTO: 0.22 THOUSAND/ΜL (ref 0–0.61)
EOSINOPHIL NFR BLD AUTO: 2 % (ref 0–6)
ERYTHROCYTE [DISTWIDTH] IN BLOOD BY AUTOMATED COUNT: 12.6 % (ref 11.6–15.1)
GFR SERPL CREATININE-BSD FRML MDRD: 102 ML/MIN/1.73SQ M
GLUCOSE SERPL-MCNC: 110 MG/DL (ref 65–140)
HCT VFR BLD AUTO: 43.4 % (ref 36.5–49.3)
HGB BLD-MCNC: 14.8 G/DL (ref 12–17)
IMM GRANULOCYTES # BLD AUTO: 0.05 THOUSAND/UL (ref 0–0.2)
IMM GRANULOCYTES NFR BLD AUTO: 0 % (ref 0–2)
LYMPHOCYTES # BLD AUTO: 1.91 THOUSANDS/ΜL (ref 0.6–4.47)
LYMPHOCYTES NFR BLD AUTO: 16 % (ref 14–44)
MCH RBC QN AUTO: 30.8 PG (ref 26.8–34.3)
MCHC RBC AUTO-ENTMCNC: 34.1 G/DL (ref 31.4–37.4)
MCV RBC AUTO: 90 FL (ref 82–98)
METHADONE UR QL: NEGATIVE
MONOCYTES # BLD AUTO: 0.79 THOUSAND/ΜL (ref 0.17–1.22)
MONOCYTES NFR BLD AUTO: 7 % (ref 4–12)
NEUTROPHILS # BLD AUTO: 9.14 THOUSANDS/ΜL (ref 1.85–7.62)
NEUTS SEG NFR BLD AUTO: 75 % (ref 43–75)
NRBC BLD AUTO-RTO: 0 /100 WBCS
OPIATES UR QL SCN: POSITIVE
OXYCODONE+OXYMORPHONE UR QL SCN: NEGATIVE
PCP UR QL: NEGATIVE
PLATELET # BLD AUTO: 262 THOUSANDS/UL (ref 149–390)
PMV BLD AUTO: 8.9 FL (ref 8.9–12.7)
POTASSIUM SERPL-SCNC: 4.4 MMOL/L (ref 3.5–5.3)
RBC # BLD AUTO: 4.81 MILLION/UL (ref 3.88–5.62)
SODIUM SERPL-SCNC: 137 MMOL/L (ref 136–145)
THC UR QL: POSITIVE
WBC # BLD AUTO: 12.15 THOUSAND/UL (ref 4.31–10.16)

## 2022-06-10 PROCEDURE — 99284 EMERGENCY DEPT VISIT MOD MDM: CPT

## 2022-06-10 PROCEDURE — 96374 THER/PROPH/DIAG INJ IV PUSH: CPT

## 2022-06-10 PROCEDURE — 80048 BASIC METABOLIC PNL TOTAL CA: CPT | Performed by: EMERGENCY MEDICINE

## 2022-06-10 PROCEDURE — 99285 EMERGENCY DEPT VISIT HI MDM: CPT | Performed by: EMERGENCY MEDICINE

## 2022-06-10 PROCEDURE — 36415 COLL VENOUS BLD VENIPUNCTURE: CPT | Performed by: EMERGENCY MEDICINE

## 2022-06-10 PROCEDURE — 99283 EMERGENCY DEPT VISIT LOW MDM: CPT

## 2022-06-10 PROCEDURE — 96375 TX/PRO/DX INJ NEW DRUG ADDON: CPT

## 2022-06-10 PROCEDURE — 80307 DRUG TEST PRSMV CHEM ANLYZR: CPT | Performed by: EMERGENCY MEDICINE

## 2022-06-10 PROCEDURE — 54220 IRRG CRPRA CAVRNOSA PRIAPISM: CPT | Performed by: EMERGENCY MEDICINE

## 2022-06-10 PROCEDURE — 85025 COMPLETE CBC W/AUTO DIFF WBC: CPT | Performed by: EMERGENCY MEDICINE

## 2022-06-10 RX ORDER — NICOTINE 21 MG/24HR
14 PATCH, TRANSDERMAL 24 HOURS TRANSDERMAL ONCE
Status: DISCONTINUED | OUTPATIENT
Start: 2022-06-10 | End: 2022-06-10 | Stop reason: HOSPADM

## 2022-06-10 RX ORDER — ONDANSETRON 2 MG/ML
4 INJECTION INTRAMUSCULAR; INTRAVENOUS ONCE
Status: COMPLETED | OUTPATIENT
Start: 2022-06-10 | End: 2022-06-10

## 2022-06-10 RX ORDER — TERBUTALINE SULFATE 2.5 MG/1
2.5 TABLET ORAL EVERY 6 HOURS
Qty: 8 TABLET | Refills: 0 | Status: SHIPPED | OUTPATIENT
Start: 2022-06-10 | End: 2022-06-12

## 2022-06-10 RX ORDER — LIDOCAINE 40 MG/G
CREAM TOPICAL ONCE
Status: COMPLETED | OUTPATIENT
Start: 2022-06-10 | End: 2022-06-10

## 2022-06-10 RX ORDER — LIDOCAINE HYDROCHLORIDE 20 MG/ML
10 INJECTION, SOLUTION EPIDURAL; INFILTRATION; INTRACAUDAL; PERINEURAL ONCE
Status: COMPLETED | OUTPATIENT
Start: 2022-06-10 | End: 2022-06-10

## 2022-06-10 RX ORDER — LIDOCAINE HYDROCHLORIDE 20 MG/ML
5 INJECTION, SOLUTION EPIDURAL; INFILTRATION; INTRACAUDAL; PERINEURAL ONCE
Status: COMPLETED | OUTPATIENT
Start: 2022-06-10 | End: 2022-06-10

## 2022-06-10 RX ORDER — HYDROMORPHONE HCL/PF 1 MG/ML
1 SYRINGE (ML) INJECTION ONCE
Status: COMPLETED | OUTPATIENT
Start: 2022-06-10 | End: 2022-06-10

## 2022-06-10 RX ADMIN — ONDANSETRON 4 MG: 2 INJECTION INTRAMUSCULAR; INTRAVENOUS at 15:02

## 2022-06-10 RX ADMIN — LIDOCAINE HYDROCHLORIDE 10 ML: 20 INJECTION, SOLUTION EPIDURAL; INFILTRATION; INTRACAUDAL; PERINEURAL at 14:26

## 2022-06-10 RX ADMIN — LIDOCAINE HYDROCHLORIDE 5 ML: 20 INJECTION, SOLUTION EPIDURAL; INFILTRATION; INTRACAUDAL; PERINEURAL at 16:48

## 2022-06-10 RX ADMIN — LIDOCAINE: 40 CREAM TOPICAL at 14:27

## 2022-06-10 RX ADMIN — HYDROMORPHONE HYDROCHLORIDE 1 MG: 1 INJECTION, SOLUTION INTRAMUSCULAR; INTRAVENOUS; SUBCUTANEOUS at 15:03

## 2022-06-10 RX ADMIN — NICOTINE 14 MG: 14 PATCH, EXTENDED RELEASE TRANSDERMAL at 17:15
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
## 2022-06-10 NOTE — EMTALA/ACUTE CARE TRANSFER
[facility] EMERGENCY DEPARTMENT  [address]  [place] [address]  Dept: [phone]      EMTALA TRANSFER CONSENT    NAME Paddy Armendariz                                         1990                              MRN [id number]    I have been informed of my rights regarding examination, treatment, and transfer   by Dr Charito Espinal DO    Benefits: Specialized equipment and/or services available at the receiving facility (Include comment)________________________    Risks: Potential deterioration of medical condition      Consent for Transfer:  I acknowledge that my medical condition has been evaluated and explained to me by the emergency department physician or other qualified medical person and/or my attending physician, who has recommended that I be transferred to the service of  Accepting Physician: Jimmie Carpenter at [address] [place] [address] Name, [facility] : [facility]  The above potential benefits of such transfer, the potential risks associated with such transfer, and the probable risks of not being transferred have been explained to me, and I fully understand them  The doctor has explained that, in my case, the benefits of transfer outweigh the risks  I agree to be transferred  I authorize the performance of emergency medical procedures and treatments upon me in both transit and upon arrival at the receiving facility  Additionally, I authorize the release of any and all medical records to the receiving facility and request they be transported with me, if possible  I understand that the safest mode of transportation during a medical emergency is an ambulance and that the Hospital advocates the use of this mode of transport  Risks of traveling to the receiving facility by car, including absence of medical control, life sustaining equipment, such as oxygen, and medical personnel has been explained to me and I fully understand them      (OLGA CORRECT BOX BELOW)  [  ]  I consent to the stated transfer and to be transported by ambulance/helicopter  [  ]  I consent to the stated transfer, but refuse transportation by ambulance and accept full responsibility for my transportation by car  I understand the risks of non-ambulance transfers and I exonerate the Hospital and its staff from any deterioration in my condition that results from this refusal     X___________________________________________    DATE  06/10/22  TIME________  Signature of patient or legally responsible individual signing on patient behalf           RELATIONSHIP TO PATIENT_________________________          Provider Certification    NAME Adele Barajas                                         1990                              MRN [id number]    A medical screening exam was performed on the above named patient  Based on the examination:    Condition Necessitating Transfer The encounter diagnosis was Priapism  Patient Condition: The patient has been stabilized such that within reasonable medical probability, no material deterioration of the patient condition or the condition of the unborn child(jovan) is likely to result from the transfer    Reason for Transfer: Level of Care needed not available at this facility    Transfer Requirements: [address]   · Space available and qualified personnel available for treatment as acknowledged by    · Agreed to accept transfer and to provide appropriate medical treatment as acknowledged by       Dalia Colin  · Appropriate medical records of the examination and treatment of the patient are provided at the time of transfer   [facility], [address] _______  · Transfer will be performed by qualified personnel from    and appropriate transfer equipment as required, including the use of necessary and appropriate life support measures      Provider Certification: I have examined the patient and explained the following risks and benefits of being transferred/refusing transfer to the patient/family:  General risk, such as traffic hazards, adverse weather conditions, rough terrain or turbulence, possible failure of equipment (including vehicle or aircraft), or consequences of actions of persons outside the control of the transport personnel      Based on these reasonable risks and benefits to the patient and/or the unborn child(jovan), and based upon the information available at the time of the patients examination, I certify that the medical benefits reasonably to be expected from the provision of appropriate medical treatments at another medical facility outweigh the increasing risks, if any, to the individuals medical condition, and in the case of labor to the unborn child, from effecting the transfer      X____________________________________________ DATE 06/10/22        TIME_______      ORIGINAL - SEND TO MEDICAL RECORDS   COPY - SEND WITH PATIENT DURING TRANSFER

## 2022-06-11 NOTE — TREATMENT PLAN
Kathy Gregory is a 75-year-old male without prior  history presenting to [address] [place] [address] emergency room with chief complaints of 4 hour erection not accompanied by fever, chills, flank, abdominal, suprapubic pain, difficulty voiding, dysuria or gross hematuria  The emergency room staff perform bedside corporal aspiration of 60 cc and instillation of 500 mcg phenylephrine under direction Dr Maverick Randall [facility] urologist)  He was transferred to [facility] for additional evaluation and monitoring postprocedure given no OR availability at [address] [place] [address] for potential shunt if indicated  Patient is afebrile, hemodynamically stable and in no apparent distress  Genitalia examined  This is an uncircumcised male with fully retractable foreskin  There is ecchymosis and bilateral penile shaft due to prior corporal aspiration  Penile shaft is not fully erect and more than 50% flaccid  He has bilaterally descended testicles and both are palpable without exquisite tenderness, crepitus, necrosis or edema  Patient is voiding  Lab Results   Component Value Date    WBC 12 15 (H) 06/10/2022    HGB 14 8 06/10/2022    HCT 43 4 06/10/2022    MCV 90 06/10/2022     06/10/2022     Lab Results   Component Value Date    SODIUM 137 06/10/2022    K 4 4 06/10/2022     06/10/2022    CO2 26 06/10/2022    BUN 19 06/10/2022    CREATININE 0 97 06/10/2022    GLUC 110 06/10/2022    CALCIUM 9 1 06/10/2022     Of note, RUDS was obtained prior to departure  06/10/2022 1821 06/10/2022 1846 Rapid drug screen, urine [[id number]]    (Abnormal)   Urine, Clean Catch    Final result Component Value   Amph/Meth UR Negative   Barbiturate Ur Negative   Benzodiazepine Urine Negative   Cocaine Urine Positive Abnormal    Methadone Urine Negative   Opiate Urine Positive Abnormal    PCP Ur Negative   THC Urine Positive Abnormal    Oxycodone Urine Negative          Plan:   stable for discharge once voids    If patient unable to void, insert Theodore catheter  Apply ice to genitalia  Prescription for terbutaline provided  Reviewed signs and symptoms to report  Counseled on illicit drug use

## 2023-08-09 ENCOUNTER — HOSPITAL ENCOUNTER (EMERGENCY)
Facility: HOSPITAL | Age: 33
Discharge: HOME | End: 2023-08-09
Attending: EMERGENCY MEDICINE
Payer: COMMERCIAL

## 2023-08-09 VITALS
DIASTOLIC BLOOD PRESSURE: 91 MMHG | BODY MASS INDEX: 26 KG/M2 | SYSTOLIC BLOOD PRESSURE: 138 MMHG | HEART RATE: 70 BPM | WEIGHT: 181.6 LBS | RESPIRATION RATE: 20 BRPM | HEIGHT: 70 IN | TEMPERATURE: 98.8 F | OXYGEN SATURATION: 100 %

## 2023-08-09 DIAGNOSIS — K04.7 DENTAL ABSCESS: Primary | ICD-10-CM

## 2023-08-09 RX ORDER — AMOXICILLIN AND CLAVULANATE POTASSIUM 875; 125 MG/1; MG/1
1 TABLET, FILM COATED ORAL ONCE
Status: COMPLETED | OUTPATIENT
Start: 2023-08-09 | End: 2023-08-09

## 2023-08-09 RX ORDER — AMOXICILLIN AND CLAVULANATE POTASSIUM 875; 125 MG/1; MG/1
1 TABLET, FILM COATED ORAL EVERY 12 HOURS SCHEDULED
Qty: 20 TABLET | Refills: 0 | Status: SHIPPED | OUTPATIENT
Start: 2023-08-09 | End: 2023-08-19

## 2023-08-09 RX ORDER — TRAMADOL HYDROCHLORIDE 50 MG/1
50 TABLET ORAL ONCE
Status: COMPLETED | OUTPATIENT
Start: 2023-08-09 | End: 2023-08-09

## 2023-08-09 RX ORDER — TRAMADOL HYDROCHLORIDE 50 MG/1
TABLET ORAL
Qty: 10 TABLET | Refills: 0 | Status: SHIPPED | OUTPATIENT
Start: 2023-08-09

## 2023-08-09 RX ADMIN — TRAMADOL HYDROCHLORIDE 50 MG: 50 TABLET, COATED ORAL at 16:55

## 2023-08-09 RX ADMIN — AMOXICILLIN AND CLAVULANATE POTASSIUM 1 TABLET: 875; 125 TABLET, FILM COATED ORAL at 16:55

## 2023-08-09 NOTE — DISCHARGE INSTRUCTIONS
Do warm salt water rinse and spit 6 times a day. Use warm compresses to swollen area. Tylenol and/or advil may be used for discomfort. Follow up with oral surgeon again in a week or two.

## 2023-10-30 ENCOUNTER — APPOINTMENT (EMERGENCY)
Dept: RADIOLOGY | Facility: HOSPITAL | Age: 33
End: 2023-10-30
Payer: COMMERCIAL

## 2023-10-30 ENCOUNTER — HOSPITAL ENCOUNTER (EMERGENCY)
Facility: HOSPITAL | Age: 33
Discharge: HOME | End: 2023-10-30
Attending: EMERGENCY MEDICINE
Payer: COMMERCIAL

## 2023-10-30 VITALS
TEMPERATURE: 97.2 F | OXYGEN SATURATION: 98 % | RESPIRATION RATE: 16 BRPM | SYSTOLIC BLOOD PRESSURE: 153 MMHG | DIASTOLIC BLOOD PRESSURE: 77 MMHG | HEART RATE: 60 BPM

## 2023-10-30 DIAGNOSIS — N45.1 EPIDIDYMITIS: Primary | ICD-10-CM

## 2023-10-30 LAB
BACTERIA UR QL AUTO: ABNORMAL /HPF
BILIRUB UR QL STRIP: NEGATIVE
CLARITY UR: CLEAR
COLOR UR: YELLOW
GLUCOSE UR STRIP-MCNC: NEGATIVE MG/DL
HGB UR QL STRIP.AUTO: ABNORMAL
HIV 1+2 AB+HIV1 P24 AG SERPL QL IA: NORMAL
HIV1 P24 AG SER QL: NORMAL
KETONES UR STRIP-MCNC: NEGATIVE MG/DL
LEUKOCYTE ESTERASE UR QL STRIP: NEGATIVE
MUCOUS THREADS UR QL AUTO: ABNORMAL
NITRITE UR QL STRIP: NEGATIVE
NON-SQ EPI CELLS URNS QL MICRO: ABNORMAL /HPF
PH UR STRIP.AUTO: 5.5 [PH]
PROT UR STRIP-MCNC: ABNORMAL MG/DL
RBC #/AREA URNS AUTO: ABNORMAL /HPF
SP GR UR STRIP.AUTO: 1.03 (ref 1–1.03)
UROBILINOGEN UR STRIP-ACNC: <2 MG/DL
WBC #/AREA URNS AUTO: ABNORMAL /HPF

## 2023-10-30 PROCEDURE — 87806 HIV AG W/HIV1&2 ANTB W/OPTIC: CPT

## 2023-10-30 PROCEDURE — 74176 CT ABD & PELVIS W/O CONTRAST: CPT

## 2023-10-30 PROCEDURE — G1004 CDSM NDSC: HCPCS

## 2023-10-30 PROCEDURE — 99284 EMERGENCY DEPT VISIT MOD MDM: CPT | Performed by: EMERGENCY MEDICINE

## 2023-10-30 PROCEDURE — 86780 TREPONEMA PALLIDUM: CPT

## 2023-10-30 PROCEDURE — 36415 COLL VENOUS BLD VENIPUNCTURE: CPT

## 2023-10-30 PROCEDURE — 76870 US EXAM SCROTUM: CPT

## 2023-10-30 PROCEDURE — 81001 URINALYSIS AUTO W/SCOPE: CPT

## 2023-10-30 PROCEDURE — 87491 CHLMYD TRACH DNA AMP PROBE: CPT

## 2023-10-30 PROCEDURE — 87591 N.GONORRHOEAE DNA AMP PROB: CPT

## 2023-10-30 PROCEDURE — 96372 THER/PROPH/DIAG INJ SC/IM: CPT

## 2023-10-30 PROCEDURE — 99283 EMERGENCY DEPT VISIT LOW MDM: CPT

## 2023-10-30 RX ORDER — DOXYCYCLINE HYCLATE 100 MG/1
100 CAPSULE ORAL 2 TIMES DAILY
Qty: 20 CAPSULE | Refills: 0 | Status: SHIPPED | OUTPATIENT
Start: 2023-10-30 | End: 2023-11-09

## 2023-10-30 RX ORDER — DOXYCYCLINE HYCLATE 100 MG/1
100 CAPSULE ORAL ONCE
Status: COMPLETED | OUTPATIENT
Start: 2023-10-30 | End: 2023-10-30

## 2023-10-30 RX ADMIN — DOXYCYCLINE 100 MG: 100 CAPSULE ORAL at 20:24

## 2023-10-30 RX ADMIN — CEFTRIAXONE 500 MG: 1 INJECTION, POWDER, FOR SOLUTION INTRAMUSCULAR; INTRAVENOUS at 20:24

## 2023-10-30 NOTE — ED ATTENDING ATTESTATION
10/30/2023  IVivek MD, saw and evaluated the patient. I have discussed the patient with the resident/non-physician practitioner and agree with the resident's/non-physician practitioner's findings, Plan of Care, and MDM as documented in the resident's/non-physician practitioner's note, except where noted. All available labs and Radiology studies were reviewed. I was present for key portions of any procedure(s) performed by the resident/non-physician practitioner and I was immediately available to provide assistance. At this point I agree with the current assessment done in the Emergency Department. I have conducted an independent evaluation of this patient a history and physical is as follows:    ED Course         Critical Care Time  Procedures    25 yo male with hx of psoriasis, c/o painless hematuria and testicular pain for few days and then started with trouble urinating. No abdominal pain, no back pain. No vomiting, no fever, no chills, no penile discharge. Vss, afebrile, lungs cta, rrr, abdomen soft nontender, no cva tenderness, testicles nontender to palpation. Rash on dorsal pain. Gc/chlamydia, hi, rpr, urine, u/s and stone study.

## 2023-10-30 NOTE — ED PROVIDER NOTES
History  Chief Complaint   Patient presents with    Groin Pain     R sided testicle pain radiating to R back starting Saturday. Difficulty urinating and blood in urine     25-year-old male presents emergency department complaining of right testicular pain. He states the pain started on Saturday. He had 1 episode of hematuria that since resolved. Has never had testicular pain before. Sexually active with women without the use condoms. He has psoriasis on bilateral elbows and knees, extensor surfaces. Denies any dysuria and hematuria at this time. No penile discharge. Uncircumcised, no phimosis or paraphimosis. Complains of right inguinal discomfort as well as tenderness along the epididymis. Complains of an inability to empty his bladder however is able to pass urine. No changes in stool function. No fevers or chills. Prior to Admission Medications   Prescriptions Last Dose Informant Patient Reported? Taking?   atorvastatin (LIPITOR) 20 mg tablet   No No   Sig: Take 1 tablet (20 mg total) by mouth daily with dinner   Patient not taking: Reported on 6/10/2022   fenofibrate (TRICOR) 145 mg tablet   No No   Sig: Take 1 tablet (145 mg total) by mouth daily   Patient not taking: Reported on 6/10/2022   nicotine (NICODERM CQ) 14 mg/24hr TD 24 hr patch   No No   Sig: Place 1 patch on the skin daily   Patient not taking: Reported on 6/10/2022   traMADol (ULTRAM) 50 mg tablet   No No   Si-2 po q 6 hours prn pain      Facility-Administered Medications: None       Past Medical History:   Diagnosis Date    Hyperlipidemia        Past Surgical History:   Procedure Laterality Date    BURN DEBRIDEMENT SURGERY      WRIST SURGERY         Family History   Problem Relation Age of Onset    Diabetes Mother     Heart disease Father         bypass; valves - 40s     I have reviewed and agree with the history as documented.     E-Cigarette/Vaping    E-Cigarette Use Current Every Day User      E-Cigarette/Vaping Substances    Nicotine No     THC Yes     CBD No     Flavoring No     Other No     Unknown No      Social History     Tobacco Use    Smoking status: Former     Packs/day: 1.00     Types: Cigarettes    Smokeless tobacco: Never   Vaping Use    Vaping Use: Every day    Substances: THC   Substance Use Topics    Alcohol use: Yes     Comment: daily    Drug use: Yes     Types: Marijuana        Review of Systems   Constitutional:  Negative for chills and fever. Gastrointestinal:  Positive for nausea. Negative for vomiting. Genitourinary:  Positive for testicular pain. Negative for penile pain and penile swelling. All other systems reviewed and are negative. Physical Exam  ED Triage Vitals   Temperature Pulse Respirations Blood Pressure SpO2   10/30/23 1723 10/30/23 1723 10/30/23 1723 10/30/23 1723 10/30/23 1723   (!) 97.2 °F (36.2 °C) 100 18 (!) 175/115 100 %      Temp Source Heart Rate Source Patient Position - Orthostatic VS BP Location FiO2 (%)   10/30/23 1723 10/30/23 1723 10/30/23 2008 10/30/23 1723 --   Temporal Monitor Lying Right arm       Pain Score       10/30/23 1723       6             Orthostatic Vital Signs  Vitals:    10/30/23 1723 10/30/23 2008   BP: (!) 175/115 153/77   Pulse: 100 60   Patient Position - Orthostatic VS:  Lying       Physical Exam  Vitals and nursing note reviewed. Exam conducted with a chaperone present. Constitutional:       General: He is not in acute distress. Appearance: He is well-developed. HENT:      Head: Normocephalic and atraumatic. Eyes:      Conjunctiva/sclera: Conjunctivae normal.   Cardiovascular:      Rate and Rhythm: Normal rate and regular rhythm. Heart sounds: No murmur heard. Pulmonary:      Effort: Pulmonary effort is normal. No respiratory distress. Breath sounds: Normal breath sounds. Abdominal:      Palpations: Abdomen is soft. Tenderness: There is no abdominal tenderness.       Hernia: There is no hernia in the left inguinal area or right inguinal area. Genitourinary:     Penis: Uncircumcised. Phimosis present. Testes: Cremasteric reflex is present. Right: Tenderness present. Mass, swelling or testicular hydrocele not present. Right testis is descended. Cremasteric reflex is present. Left: Mass, tenderness, swelling or testicular hydrocele not present. Left testis is descended. Cremasteric reflex is present. Epididymis:      Right: Tenderness present. Left: No tenderness. Comments: Rash on dorsal aspect of penis. Rash on the penis looks like psoriasis. He denies pain and itch. Musculoskeletal:         General: No swelling. Cervical back: Neck supple. Skin:     General: Skin is warm and dry. Capillary Refill: Capillary refill takes less than 2 seconds. Findings: Rash present. Comments: Psoriatic rash on extensor surfaces of elbows and knees. Neurological:      General: No focal deficit present. Mental Status: He is alert and oriented to person, place, and time. Mental status is at baseline. Cranial Nerves: No cranial nerve deficit. Sensory: No sensory deficit. Motor: No weakness. Coordination: Coordination normal.      Gait: Gait normal.   Psychiatric:         Mood and Affect: Mood normal.         ED Medications  Medications   cefTRIAXone (ROCEPHIN) 500 mg in lidocaine (PF) (XYLOCAINE-MPF) 1 % IM only syringe (500 mg Intramuscular Given 10/30/23 2024)   doxycycline hyclate (VIBRAMYCIN) capsule 100 mg (100 mg Oral Given 10/30/23 2024)       Diagnostic Studies  Results Reviewed       Procedure Component Value Units Date/Time    RAPID HIV 1/2 AB-AG COMBO for 15years old and above [[id number]]  (Normal) Collected: 10/30/23 1819    Lab Status: Final result Specimen: Blood from Hand, Left Updated: 10/30/23 2002     Rapid HIV 1 AND 2 Non-Reactive     HIV-1 P24 Ag Screen Non-Reactive    Narrative:      Negative for HIV-1 p24 Antigen.   Negative for HIV-1 and/or HIV-2 Antibody. Urine Microscopic [[id number]]  (Abnormal) Collected: 10/30/23 1816    Lab Status: Final result Specimen: Urine, Other Updated: 10/30/23 1859     RBC, UA 2-4 /hpf      WBC, UA 4-10 /hpf      Epithelial Cells Occasional /hpf      Bacteria, UA Occasional /hpf      MUCUS THREADS Moderate    UA w Reflex to Microscopic w Reflex to Culture [[id number]]  (Abnormal) Collected: 10/30/23 1816    Lab Status: Final result Specimen: Urine, Other Updated: 10/30/23 1847     Color, UA Yellow     Clarity, UA Clear     Specific Gravity, UA 1.026     pH, UA 5.5     Leukocytes, UA Negative     Nitrite, UA Negative     Protein, UA Trace mg/dl      Glucose, UA Negative mg/dl      Ketones, UA Negative mg/dl      Urobilinogen, UA <2.0 mg/dl      Bilirubin, UA Negative     Occult Blood, UA Small    Chlamydia/GC amplified DNA by PCR [[id number]] Collected: 10/30/23 1816    Lab Status: In process Specimen: Urine, Other Updated: 10/30/23 1822    RPR-Syphilis Screening (Total Syphilis IGG/IGM) [[id number]] Collected: 10/30/23 1819    Lab Status: In process Specimen: Blood from Hand, Left Updated: 10/30/23 1822                   CT renal stone study abdomen pelvis wo contrast   Final Result by Denita Lugo MD (10/30 1929)      No acute abnormality in abdomen or pelvis. No urinary tract calculi, as clinically questioned. Symmetrically calcified bilateral adrenal glands, likely sequela of remote injury. Additional chronic/incidental findings as detailed above. 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 Otherwise unremarkable scrotal ultrasound.       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 Otherwise unremarkable scrotal ultrasound. Workstation performed: [id number]     1937 WBC, UA(!): 4-10   1937 CT renal stone study abdomen pelvis wo contrast  IMPRESSION:     No acute abnormality in abdomen or pelvis. No urinary tract calculi, as clinically questioned. Symmetrically calcified bilateral adrenal glands, likely sequela of remote injury. Additional chronic/incidental findings as detailed above. Workstation performed: [id number]     2023 Patient explained of all test results at this time. CT renal study negative. CT scrotum and testicles negative. Urinalysis is indeterminate. We will treat as epididymitis caused by sexual transmitted disease. HIV is nonreactive. RPR still pending. Gonorrhea chlamydia pending at this time. Patient informed that if results are positive, representative from [facility] will call to schedule treatment. Patient verbalized understanding will receive ceftriaxone injection and doxycycline and discharged home. SBIRT 22yo+      Flowsheet Row Most Recent Value   Initial Alcohol Screen: US AUDIT-C     1. How often do you have a drink containing alcohol? 0 Filed at: 10/30/2023 2004   2. How many drinks containing alcohol do you have on a typical day you are drinking? 0 Filed at: 10/30/2023 2004   3a. Male UNDER 65: How often do you have five or more drinks on one occasion? 0 Filed at: 10/30/2023 2004   Audit-C Score 0 Filed at: 10/30/2023 2004   SILKE: How many times in the past year have you. .. Used an illegal drug or used a prescription medication for non-medical reasons?  Never Filed at: 10/30/2023 2004                  Medical Decision Making  70-year-old male presents emergency department complaining of right-sided testicular and epididymis pain    DDx: Dermatitis, prostatitis, urinary tract infection, lower concern for testicular torsion, orchitis, inguinal hernia, consideration for nephrolithiasis, syphilis and HIV    Plan: Testicular ultrasound, renal stone study, HIV, RPR, GC chlamydia, urinalysis    Based on lab results, will cover patient for epididymitis from sexual transmitted disease. Patient is in agreement with treatment plan. Rapid HIV is negative. RPR still pending at this time. Urine GC chlamydia still pending however patient will receive IM ceftriaxone and 10-day course of doxycycline twice daily. Which will cover for gonorrhea and chlamydia. Renal stone study negative, testicular ultrasound negative. Amount and/or Complexity of Data Reviewed  Labs: ordered. Decision-making details documented in ED Course. Radiology: ordered. Decision-making details documented in ED Course. Risk  Prescription drug management. Disposition  Final diagnoses:   Epididymitis     Time reflects when diagnosis was documented in both MDM as applicable and the Disposition within this note       Time User Action Codes Description Comment    10/30/2023  8:24 PM Anant Luis Add [N45.1] Epididymitis           ED Disposition       ED Disposition   Discharge    Condition   Stable    Date/Time   Mon Oct 30, 2023 2024    [address] discharge to home/self care.                    Follow-up Information       Follow up With Specialties Details Why Contact Info Additional [facility], [place]    [address], [address] [place] [address] Emergency Department Emergency Medicine Go to  If symptoms worsen [address] [facility] Emergency Department, [address] [place] [address],[address] Floor, [facility]            Discharge Medication List as of 10/30/2023  8:56 PM        START taking these medications    Details   doxycycline hyclate (VIBRAMYCIN) 100 mg capsule Take 1 capsule (100 mg total) by mouth 2 (two) times a day for 10 days, Starting Mon 10/30/2023, Until Thu 11/9/2023, Normal           CONTINUE these medications which have NOT CHANGED    Details   atorvastatin (LIPITOR) 20 mg tablet Take 1 tablet (20 mg total) by mouth daily with dinner, Starting Mon 1/25/2021, Normal      fenofibrate (TRICOR) 145 mg tablet Take 1 tablet (145 mg total) by mouth daily, Starting Mon 1/25/2021, Normal      nicotine (NICODERM CQ) 14 mg/24hr TD 24 hr patch Place 1 patch on the skin daily, Starting Tue 1/26/2021, Normal      traMADol (ULTRAM) 50 mg tablet 1-2 po q 6 hours prn pain, Normal           No discharge procedures on file. PDMP Review       None             ED Provider  Attending physically available and evaluated Brittney Dodd. I managed the patient along with the ED Attending.     Electronically Signed by           Lilia Blue DO  10/31/23 9069

## 2023-10-30 NOTE — Clinical Note
Juanitaernesto Sutherland was seen and treated in our emergency department on 10/30/2023. as tolerated    Diagnosis: testicular pain    [place]  . He may return on this date: 10/31/2023         If you have any questions or concerns, please don't hesitate to call.       Johanna Douglas, DO    ______________________________           _______________          _______________  Hospital Representative                              Date                                Time

## 2023-10-31 LAB
C TRACH DNA SPEC QL NAA+PROBE: NEGATIVE
N GONORRHOEA DNA SPEC QL NAA+PROBE: NEGATIVE
TREPONEMA PALLIDUM IGG+IGM AB [PRESENCE] IN SERUM OR PLASMA BY IMMUNOASSAY: NORMAL

## 2023-10-31 NOTE — DISCHARGE INSTRUCTIONS
Kathrine Fitzpatrick was seen and evaluated today in the emergency department over your concern of right testicular pain. The workup that we performed showed right epididymitis . Please return to the emergency department if you experience worsening pain or any other signs and symptoms that may be concerning to you. Please follow-up with your primary care doctor within 1 day. All questions were answered prior to discharge. Thank you for choosing [facility] for your care.     Followup with PCP

## 2024-01-31 ENCOUNTER — APPOINTMENT (EMERGENCY)
Dept: RADIOLOGY | Facility: HOSPITAL | Age: 34
End: 2024-01-31
Payer: COMMERCIAL

## 2024-01-31 ENCOUNTER — HOSPITAL ENCOUNTER (EMERGENCY)
Facility: HOSPITAL | Age: 34
Discharge: HOME | End: 2024-01-31
Attending: EMERGENCY MEDICINE
Payer: COMMERCIAL

## 2024-01-31 VITALS
HEART RATE: 67 BPM | SYSTOLIC BLOOD PRESSURE: 185 MMHG | RESPIRATION RATE: 20 BRPM | DIASTOLIC BLOOD PRESSURE: 97 MMHG | TEMPERATURE: 97.7 F | OXYGEN SATURATION: 97 %

## 2024-01-31 DIAGNOSIS — N32.89 MASS OF URINARY BLADDER: Primary | ICD-10-CM

## 2024-01-31 LAB
ALBUMIN SERPL BCP-MCNC: 4.8 G/DL (ref 3.5–5)
ALP SERPL-CCNC: 72 U/L (ref 34–104)
ALT SERPL W P-5'-P-CCNC: 133 U/L (ref 7–52)
ANION GAP SERPL CALCULATED.3IONS-SCNC: 7 MMOL/L
AST SERPL W P-5'-P-CCNC: 64 U/L (ref 13–39)
BACTERIA UR QL AUTO: NORMAL /HPF
BASOPHILS # BLD AUTO: 0.06 THOUSANDS/ÂΜL (ref 0–0.1)
BASOPHILS NFR BLD AUTO: 1 % (ref 0–1)
BILIRUB SERPL-MCNC: 0.43 MG/DL (ref 0.2–1)
BILIRUB UR QL STRIP: NEGATIVE
BUN SERPL-MCNC: 11 MG/DL (ref 5–25)
CALCIUM SERPL-MCNC: 9.9 MG/DL (ref 8.4–10.2)
CHLORIDE SERPL-SCNC: 100 MMOL/L (ref 96–108)
CLARITY UR: CLEAR
CO2 SERPL-SCNC: 30 MMOL/L (ref 21–32)
COLOR UR: COLORLESS
CREAT SERPL-MCNC: 0.83 MG/DL (ref 0.6–1.3)
EOSINOPHIL # BLD AUTO: 0.55 THOUSAND/ÂΜL (ref 0–0.61)
EOSINOPHIL NFR BLD AUTO: 6 % (ref 0–6)
ERYTHROCYTE [DISTWIDTH] IN BLOOD BY AUTOMATED COUNT: 12.1 % (ref 11.6–15.1)
GFR SERPL CREATININE-BSD FRML MDRD: 113 ML/MIN/1.73SQ M
GLUCOSE SERPL-MCNC: 81 MG/DL (ref 65–140)
GLUCOSE UR STRIP-MCNC: NEGATIVE MG/DL
HCT VFR BLD AUTO: 43.5 % (ref 36.5–49.3)
HGB BLD-MCNC: 14.5 G/DL (ref 12–17)
HGB UR QL STRIP.AUTO: ABNORMAL
IMM GRANULOCYTES # BLD AUTO: 0.03 THOUSAND/UL (ref 0–0.2)
IMM GRANULOCYTES NFR BLD AUTO: 0 % (ref 0–2)
KETONES UR STRIP-MCNC: NEGATIVE MG/DL
LEUKOCYTE ESTERASE UR QL STRIP: NEGATIVE
LYMPHOCYTES # BLD AUTO: 4.15 THOUSANDS/ÂΜL (ref 0.6–4.47)
LYMPHOCYTES NFR BLD AUTO: 42 % (ref 14–44)
MCH RBC QN AUTO: 29.6 PG (ref 26.8–34.3)
MCHC RBC AUTO-ENTMCNC: 33.3 G/DL (ref 31.4–37.4)
MCV RBC AUTO: 89 FL (ref 82–98)
MONOCYTES # BLD AUTO: 0.84 THOUSAND/ÂΜL (ref 0.17–1.22)
MONOCYTES NFR BLD AUTO: 8 % (ref 4–12)
NEUTROPHILS # BLD AUTO: 4.32 THOUSANDS/ÂΜL (ref 1.85–7.62)
NEUTS SEG NFR BLD AUTO: 43 % (ref 43–75)
NITRITE UR QL STRIP: NEGATIVE
NON-SQ EPI CELLS URNS QL MICRO: NORMAL /HPF
NRBC BLD AUTO-RTO: 0 /100 WBCS
PH UR STRIP.AUTO: 7 [PH]
PLATELET # BLD AUTO: 315 THOUSANDS/UL (ref 149–390)
PMV BLD AUTO: 8.7 FL (ref 8.9–12.7)
POTASSIUM SERPL-SCNC: 4 MMOL/L (ref 3.5–5.3)
PROT SERPL-MCNC: 7.5 G/DL (ref 6.4–8.4)
PROT UR STRIP-MCNC: NEGATIVE MG/DL
RBC # BLD AUTO: 4.9 MILLION/UL (ref 3.88–5.62)
RBC #/AREA URNS AUTO: NORMAL /HPF
SODIUM SERPL-SCNC: 137 MMOL/L (ref 135–147)
SP GR UR STRIP.AUTO: <=1.005 (ref 1–1.03)
UROBILINOGEN UR QL STRIP.AUTO: 0.2 E.U./DL
WBC # BLD AUTO: 9.95 THOUSAND/UL (ref 4.31–10.16)
WBC #/AREA URNS AUTO: NORMAL /HPF

## 2024-01-31 PROCEDURE — 81001 URINALYSIS AUTO W/SCOPE: CPT

## 2024-01-31 PROCEDURE — 80053 COMPREHEN METABOLIC PANEL: CPT | Performed by: EMERGENCY MEDICINE

## 2024-01-31 PROCEDURE — G1004 CDSM NDSC: HCPCS

## 2024-01-31 PROCEDURE — 87086 URINE CULTURE/COLONY COUNT: CPT | Performed by: EMERGENCY MEDICINE

## 2024-01-31 PROCEDURE — 74177 CT ABD & PELVIS W/CONTRAST: CPT

## 2024-01-31 PROCEDURE — 99284 EMERGENCY DEPT VISIT MOD MDM: CPT | Performed by: EMERGENCY MEDICINE

## 2024-01-31 PROCEDURE — 96360 HYDRATION IV INFUSION INIT: CPT

## 2024-01-31 PROCEDURE — 36415 COLL VENOUS BLD VENIPUNCTURE: CPT | Performed by: EMERGENCY MEDICINE

## 2024-01-31 PROCEDURE — 85025 COMPLETE CBC W/AUTO DIFF WBC: CPT | Performed by: EMERGENCY MEDICINE

## 2024-01-31 PROCEDURE — 99283 EMERGENCY DEPT VISIT LOW MDM: CPT

## 2024-01-31 RX ADMIN — IOHEXOL 100 ML: 350 INJECTION, SOLUTION INTRAVENOUS at 22:12

## 2024-01-31 RX ADMIN — SODIUM CHLORIDE 1000 ML: 0.9 INJECTION, SOLUTION INTRAVENOUS at 22:02

## 2024-01-31 NOTE — Clinical Note
Bryan Lee was seen and treated in our emergency department on 1/31/2024.                Diagnosis:     Bryan  may return to work on return date.    He may return on this date: 02/04/2024         If you have any questions or concerns, please don't hesitate to call.      Dino Mosquera, DO    ______________________________           _______________          _______________  Hospital Representative                              Date                                Time

## 2024-02-01 NOTE — ED PROVIDER NOTES
History  Chief Complaint   Patient presents with    Blood in Urine     Having several instances of blood in urine today. Some discomfort on R back and side     33-year-old male presents the ED states he has had several instances of urinating blood today.  He states he had a long drive and on his way back he stopped 4-5 times to urinate at least 3 of those were bloody.  Denies any pain with it however he states now he is having some suprapubic and pain in his right flank.  No history of kidney stones no history of trauma.  No other complaints.  Patient states this all started after he had a large bowel movement where he strained a lot.        Prior to Admission Medications   Prescriptions Last Dose Informant Patient Reported? Taking?   atorvastatin (LIPITOR) 20 mg tablet   No No   Sig: Take 1 tablet (20 mg total) by mouth daily with dinner   Patient not taking: Reported on 6/10/2022   fenofibrate (TRICOR) 145 mg tablet   No No   Sig: Take 1 tablet (145 mg total) by mouth daily   Patient not taking: Reported on 6/10/2022   nicotine (NICODERM CQ) 14 mg/24hr TD 24 hr patch   No No   Sig: Place 1 patch on the skin daily   Patient not taking: Reported on 6/10/2022   traMADol (ULTRAM) 50 mg tablet Not Taking  No No   Si-2 po q 6 hours prn pain   Patient not taking: Reported on 2024      Facility-Administered Medications: None       Past Medical History:   Diagnosis Date    Hyperlipidemia        Past Surgical History:   Procedure Laterality Date    BURN DEBRIDEMENT SURGERY      WRIST SURGERY         Family History   Problem Relation Age of Onset    Diabetes Mother     Heart disease Father         bypass; valves - 40s     I have reviewed and agree with the history as documented.    E-Cigarette/Vaping    E-Cigarette Use Current Every Day User      E-Cigarette/Vaping Substances    Nicotine No     THC Yes     CBD No     Flavoring No     Other No     Unknown No      Social History     Tobacco Use    Smoking status:  Former     Current packs/day: 1.00     Types: Cigarettes    Smokeless tobacco: Never   Vaping Use    Vaping status: Every Day    Substances: THC   Substance Use Topics    Alcohol use: Not Currently     Comment: not since oct    Drug use: Yes     Types: Marijuana       Review of Systems   Constitutional:  Negative for activity change, chills, diaphoresis and fever.   HENT:  Negative for congestion, ear pain, nosebleeds, sore throat, trouble swallowing and voice change.    Eyes:  Negative for pain, discharge and redness.   Respiratory:  Negative for apnea, cough, choking, shortness of breath, wheezing and stridor.    Cardiovascular:  Negative for chest pain and palpitations.   Gastrointestinal:  Negative for abdominal distention, abdominal pain, constipation, diarrhea, nausea and vomiting.   Endocrine: Negative for polydipsia.   Genitourinary:  Positive for flank pain and hematuria. Negative for difficulty urinating, dysuria, frequency and urgency.   Musculoskeletal:  Negative for back pain, gait problem, joint swelling, myalgias, neck pain and neck stiffness.   Skin:  Negative for pallor and rash.   Neurological:  Negative for dizziness, tremors, syncope, speech difficulty, weakness, numbness and headaches.   Hematological:  Negative for adenopathy.   Psychiatric/Behavioral:  Negative for confusion, hallucinations, self-injury and suicidal ideas. The patient is not nervous/anxious.        Physical Exam  Physical Exam  Vitals and nursing note reviewed.   Constitutional:       General: He is not in acute distress.     Appearance: He is well-developed. He is not diaphoretic.   HENT:      Head: Normocephalic and atraumatic.      Right Ear: External ear normal.      Left Ear: External ear normal.      Nose: Nose normal.   Eyes:      Conjunctiva/sclera: Conjunctivae normal.      Pupils: Pupils are equal, round, and reactive to light.   Cardiovascular:      Rate and Rhythm: Normal rate and regular rhythm.      Heart sounds:  Normal heart sounds.   Pulmonary:      Effort: Pulmonary effort is normal.      Breath sounds: Normal breath sounds.   Abdominal:      General: Bowel sounds are normal.      Palpations: Abdomen is soft.      Tenderness: There is abdominal tenderness. There is right CVA tenderness.      Comments: Diffuse tenderness   Musculoskeletal:         General: Normal range of motion.      Cervical back: Normal range of motion and neck supple.   Skin:     General: Skin is warm and dry.   Neurological:      Mental Status: He is alert and oriented to person, place, and time.      Deep Tendon Reflexes: Reflexes are normal and symmetric.   Psychiatric:         Behavior: Behavior is cooperative.         Vital Signs  ED Triage Vitals [01/31/24 2045]   Temperature Pulse Respirations Blood Pressure SpO2   97.7 °F (36.5 °C) 70 20 146/93 100 %      Temp src Heart Rate Source Patient Position - Orthostatic VS BP Location FiO2 (%)   -- Monitor Sitting Right arm --      Pain Score       5           Vitals:    01/31/24 2045   BP: 146/93   Pulse: 70   Patient Position - Orthostatic VS: Sitting         Visual Acuity      ED Medications  Medications   sodium chloride 0.9 % bolus 1,000 mL (1,000 mL Intravenous New Bag 1/31/24 2202)   iohexol (OMNIPAQUE) 350 MG/ML injection (MULTI-DOSE) 100 mL (100 mL Intravenous Given 1/31/24 2212)       Diagnostic Studies  Results Reviewed       Procedure Component Value Units Date/Time    Urine Microscopic [[id number]]  (Normal) Collected: 01/31/24 2159    Lab Status: Final result Specimen: Urine, Clean Catch Updated: 01/31/24 2243     RBC, UA 0-1 /hpf      WBC, UA 1-2 /hpf      Epithelial Cells None Seen /hpf      Bacteria, UA Occasional /hpf     Comprehensive metabolic panel [[id number]]  (Abnormal) Collected: 01/31/24 2159    Lab Status: Final result Specimen: Blood from Arm, Right Updated: 01/31/24 2226     Sodium 137 mmol/L      Potassium 4.0 mmol/L      Chloride 100 mmol/L      CO2 30 mmol/L      ANION GAP  7 mmol/L      BUN 11 mg/dL      Creatinine 0.83 mg/dL      Glucose 81 mg/dL      Calcium 9.9 mg/dL      AST 64 U/L       U/L      Alkaline Phosphatase 72 U/L      Total Protein 7.5 g/dL      Albumin 4.8 g/dL      Total Bilirubin 0.43 mg/dL      eGFR 113 ml/min/1.73sq m     Narrative:      National Kid[employer] Disease Foundation guidelines for Chronic Kidney Disease (CKD):     Stage 1 with normal or high GFR (GFR > 90 mL/min/1.73 square meters)    Stage 2 Mild CKD (GFR = 60-89 mL/min/1.73 square meters)    Stage 3A Moderate CKD (GFR = 45-59 mL/min/1.73 square meters)    Stage 3B Moderate CKD (GFR = 30-44 mL/min/1.73 square meters)    Stage 4 Severe CKD (GFR = 15-29 mL/min/1.73 square meters)    Stage 5 End Stage CKD (GFR <15 mL/min/1.73 square meters)  Note: GFR calculation is accurate only with a steady state creatinine    UA (URINE) with reflex to Scope [[id number]]  (Abnormal) Collected: 01/31/24 2159    Lab Status: Final result Specimen: Urine, Clean Catch Updated: 01/31/24 2215     Color, UA Colorless     Clarity, UA Clear     Specific Gravity, UA <=1.005     pH, UA 7.0     Leukocytes, UA Negative     Nitrite, UA Negative     Protein, UA Negative mg/dl      Glucose, UA Negative mg/dl      Ketones, UA Negative mg/dl      Urobilinogen, UA 0.2 E.U./dl      Bilirubin, UA Negative     Occult Blood, UA Moderate    CBC and differential [[id number]]  (Abnormal) Collected: 01/31/24 2159    Lab Status: Final result Specimen: Blood from Arm, Right Updated: 01/31/24 2209     WBC 9.95 Thousand/uL      RBC 4.90 Million/uL      Hemoglobin 14.5 g/dL      Hematocrit 43.5 %      MCV 89 fL      MCH 29.6 pg      MCHC 33.3 g/dL      RDW 12.1 %      MPV 8.7 fL      Platelets 315 Thousands/uL      nRBC 0 /100 WBCs      Neutrophils Relative 43 %      Immat GRANS % 0 %      Lymphocytes Relative 42 %      Monocytes Relative 8 %      Eosinophils Relative 6 %      Basophils Relative 1 %      Neutrophils Absolute 4.32 Thousands/µL       Immature Grans Absolute 0.03 Thousand/uL      Lymphocytes Absolute 4.15 Thousands/µL      Monocytes Absolute 0.84 Thousand/µL      Eosinophils Absolute 0.55 Thousand/µL      Basophils Absolute 0.06 Thousands/µL     Urine culture [[id number]] Collected: 01/31/24 2200    Lab Status: In process Specimen: Urine, Clean Catch Updated: 01/31/24 2206                   CT abdomen pelvis with contrast   Final Result by Chris Enriquez MD (01/31 2309)      Soft tissue mass at the posterior left urinary bladder wall suspicious for malignancy. Further evaluation with cystoscopy or urinary bladder ultrasound is suggested. Urology consultation advised.      No hydronephrosis or hydroureter.      No free air or free fluid.      Stable symmetrically calcified bilateral adrenal glands, likely the sequela of remote injury.               I personally discussed this study with MARIANA LEI on 1/31/2024 11:04 PM.            Workstation performed: [id number]                    Procedures  Procedures         ED Course                                             Medical Decision Making  Amount and/or Complexity of Data Reviewed  Labs: ordered.  Radiology: ordered.    Risk  Prescription drug management.             Disposition  Final diagnoses:   Mass of urinary bladder     Time reflects when diagnosis was documented in both MDM as applicable and the Disposition within this note       Time User Action Codes Description Comment    1/31/2024 11:10 PM Mariana Lei Add [N32.89] Mass of urinary bladder           ED Disposition       ED Disposition   Discharge    Condition   Stable    Date/Time   Wed Jan 31, 2024 11:10 PM    Comment   Bryan Lee discharge to home/self care.                   Follow-up Information       Follow up With Specialties Details Why Contact Info    Larry Kuhn MD Urology Schedule an appointment as soon as possible for a visit  As needed [address] [place] [id number]  [phone]               Patient's Medications   Discharge Prescriptions    No medications on file       No discharge procedures on file.    PDMP Review       None            ED Provider  Electronically Signed by             Dino Mosquera DO  01/31/24 6867

## 2024-02-02 LAB — BACTERIA UR CULT: NORMAL

## 2024-04-09 ENCOUNTER — OFFICE VISIT (OUTPATIENT)
Dept: FAMILY MEDICINE CLINIC | Facility: CLINIC | Age: 34
End: 2024-04-09
Payer: COMMERCIAL

## 2024-04-09 ENCOUNTER — APPOINTMENT (EMERGENCY)
Dept: RADIOLOGY | Facility: HOSPITAL | Age: 34
End: 2024-04-09
Payer: COMMERCIAL

## 2024-04-09 ENCOUNTER — HOSPITAL ENCOUNTER (EMERGENCY)
Facility: HOSPITAL | Age: 34
Discharge: HOME | End: 2024-04-10
Attending: EMERGENCY MEDICINE
Payer: COMMERCIAL

## 2024-04-09 VITALS
DIASTOLIC BLOOD PRESSURE: 78 MMHG | TEMPERATURE: 98.8 F | HEART RATE: 65 BPM | SYSTOLIC BLOOD PRESSURE: 137 MMHG | BODY MASS INDEX: 26.17 KG/M2 | OXYGEN SATURATION: 99 % | WEIGHT: 175.93 LBS | RESPIRATION RATE: 16 BRPM

## 2024-04-09 VITALS
DIASTOLIC BLOOD PRESSURE: 100 MMHG | SYSTOLIC BLOOD PRESSURE: 158 MMHG | TEMPERATURE: 99.3 F | BODY MASS INDEX: 26.07 KG/M2 | WEIGHT: 176 LBS | HEIGHT: 69 IN | HEART RATE: 92 BPM | RESPIRATION RATE: 16 BRPM

## 2024-04-09 DIAGNOSIS — E78.2 MIXED HYPERLIPIDEMIA: ICD-10-CM

## 2024-04-09 DIAGNOSIS — M54.9 UPPER BACK PAIN ON LEFT SIDE: Primary | ICD-10-CM

## 2024-04-09 DIAGNOSIS — N32.89 BLADDER MASS: Primary | ICD-10-CM

## 2024-04-09 DIAGNOSIS — F41.9 ANXIETY: ICD-10-CM

## 2024-04-09 DIAGNOSIS — Z13.6 SCREENING FOR CARDIOVASCULAR CONDITION: ICD-10-CM

## 2024-04-09 PROBLEM — D72.829 LEUKOCYTOSIS: Status: RESOLVED | Noted: 2021-01-23 | Resolved: 2024-04-09

## 2024-04-09 PROBLEM — F19.10 DRUG ABUSE (HCC): Status: RESOLVED | Noted: 2021-01-25 | Resolved: 2024-04-09

## 2024-04-09 LAB
ALBUMIN SERPL BCP-MCNC: 4.4 G/DL (ref 3.5–5)
ALP SERPL-CCNC: 62 U/L (ref 34–104)
ALT SERPL W P-5'-P-CCNC: 17 U/L (ref 7–52)
ANION GAP SERPL CALCULATED.3IONS-SCNC: 7 MMOL/L (ref 4–13)
AST SERPL W P-5'-P-CCNC: 16 U/L (ref 13–39)
BACTERIA UR QL AUTO: ABNORMAL /HPF
BASOPHILS # BLD AUTO: 0.05 THOUSANDS/ÂΜL (ref 0–0.1)
BASOPHILS NFR BLD AUTO: 1 % (ref 0–1)
BILIRUB SERPL-MCNC: 0.52 MG/DL (ref 0.2–1)
BILIRUB UR QL STRIP: NEGATIVE
BUN SERPL-MCNC: 16 MG/DL (ref 5–25)
CALCIUM SERPL-MCNC: 9 MG/DL (ref 8.4–10.2)
CARDIAC TROPONIN I PNL SERPL HS: <2 NG/L
CHLORIDE SERPL-SCNC: 104 MMOL/L (ref 96–108)
CLARITY UR: CLEAR
CO2 SERPL-SCNC: 26 MMOL/L (ref 21–32)
COLOR UR: ABNORMAL
CREAT SERPL-MCNC: 0.87 MG/DL (ref 0.6–1.3)
D DIMER PPP FEU-MCNC: <0.27 UG/ML FEU
EOSINOPHIL # BLD AUTO: 0.46 THOUSAND/ÂΜL (ref 0–0.61)
EOSINOPHIL NFR BLD AUTO: 5 % (ref 0–6)
ERYTHROCYTE [DISTWIDTH] IN BLOOD BY AUTOMATED COUNT: 12 % (ref 11.6–15.1)
GFR SERPL CREATININE-BSD FRML MDRD: 111 ML/MIN/1.73SQ M
GLUCOSE SERPL-MCNC: 95 MG/DL (ref 65–140)
GLUCOSE UR STRIP-MCNC: NEGATIVE MG/DL
HCT VFR BLD AUTO: 40.1 % (ref 36.5–49.3)
HGB BLD-MCNC: 13.7 G/DL (ref 12–17)
HGB UR QL STRIP.AUTO: ABNORMAL
IMM GRANULOCYTES # BLD AUTO: 0.03 THOUSAND/UL (ref 0–0.2)
IMM GRANULOCYTES NFR BLD AUTO: 0 % (ref 0–2)
KETONES UR STRIP-MCNC: NEGATIVE MG/DL
LEUKOCYTE ESTERASE UR QL STRIP: NEGATIVE
LYMPHOCYTES # BLD AUTO: 2.05 THOUSANDS/ÂΜL (ref 0.6–4.47)
LYMPHOCYTES NFR BLD AUTO: 23 % (ref 14–44)
MCH RBC QN AUTO: 29.8 PG (ref 26.8–34.3)
MCHC RBC AUTO-ENTMCNC: 34.2 G/DL (ref 31.4–37.4)
MCV RBC AUTO: 87 FL (ref 82–98)
MONOCYTES # BLD AUTO: 0.77 THOUSAND/ÂΜL (ref 0.17–1.22)
MONOCYTES NFR BLD AUTO: 9 % (ref 4–12)
NEUTROPHILS # BLD AUTO: 5.72 THOUSANDS/ÂΜL (ref 1.85–7.62)
NEUTS SEG NFR BLD AUTO: 62 % (ref 43–75)
NITRITE UR QL STRIP: NEGATIVE
NON-SQ EPI CELLS URNS QL MICRO: ABNORMAL /HPF
NRBC BLD AUTO-RTO: 0 /100 WBCS
PH UR STRIP.AUTO: 5.5 [PH]
PLATELET # BLD AUTO: 248 THOUSANDS/UL (ref 149–390)
PMV BLD AUTO: 8.9 FL (ref 8.9–12.7)
POTASSIUM SERPL-SCNC: 4 MMOL/L (ref 3.5–5.3)
PROT SERPL-MCNC: 6.9 G/DL (ref 6.4–8.4)
PROT UR STRIP-MCNC: NEGATIVE MG/DL
RBC # BLD AUTO: 4.59 MILLION/UL (ref 3.88–5.62)
RBC #/AREA URNS AUTO: ABNORMAL /HPF
SODIUM SERPL-SCNC: 137 MMOL/L (ref 135–147)
SP GR UR STRIP.AUTO: 1.02 (ref 1–1.03)
UROBILINOGEN UR STRIP-ACNC: <2 MG/DL
WBC # BLD AUTO: 9.08 THOUSAND/UL (ref 4.31–10.16)
WBC #/AREA URNS AUTO: ABNORMAL /HPF

## 2024-04-09 PROCEDURE — 85025 COMPLETE CBC W/AUTO DIFF WBC: CPT

## 2024-04-09 PROCEDURE — 80053 COMPREHEN METABOLIC PANEL: CPT

## 2024-04-09 PROCEDURE — 85379 FIBRIN DEGRADATION QUANT: CPT

## 2024-04-09 PROCEDURE — 36415 COLL VENOUS BLD VENIPUNCTURE: CPT

## 2024-04-09 PROCEDURE — 71046 X-RAY EXAM CHEST 2 VIEWS: CPT

## 2024-04-09 PROCEDURE — 84484 ASSAY OF TROPONIN QUANT: CPT

## 2024-04-09 PROCEDURE — 81001 URINALYSIS AUTO W/SCOPE: CPT

## 2024-04-09 PROCEDURE — 99203 OFFICE O/P NEW LOW 30 MIN: CPT | Performed by: NURSE PRACTITIONER

## 2024-04-09 RX ORDER — ATORVASTATIN CALCIUM 20 MG/1
20 TABLET, FILM COATED ORAL
Qty: 90 TABLET | Refills: 1 | Status: SHIPPED | OUTPATIENT
Start: 2024-04-09

## 2024-04-09 RX ORDER — DIAZEPAM 5 MG/1
5 TABLET ORAL ONCE
Status: COMPLETED | OUTPATIENT
Start: 2024-04-09 | End: 2024-04-09

## 2024-04-09 RX ADMIN — DIAZEPAM 5 MG: 5 TABLET ORAL at 23:19

## 2024-04-09 NOTE — ASSESSMENT & PLAN NOTE
Given family history of CAD, discussed importance of taking a statin.   Reviewed previous labs showing elevated cholesterol.   Previous CT shows mild atherosclerosis in aorta.   Order given for repeat labs.

## 2024-04-09 NOTE — PROGRESS NOTES
Assessment/Plan:    1. Bladder mass  Assessment & Plan:  S/p resection by urology through [facility].  Has not yet received pathology results        2. Mixed hyperlipidemia  Assessment & Plan:  Given family history of CAD, discussed importance of taking a statin.   Reviewed previous labs showing elevated cholesterol.   Previous CT shows mild atherosclerosis in aorta.   Order given for repeat labs.       Orders:  -     Lipid panel; Future; Expected date: 07/09/2024  -     Lipid panel  -     atorvastatin (LIPITOR) 20 mg tablet; Take 1 tablet (20 mg total) by mouth daily with dinner    3. Screening for cardiovascular condition  -     Comprehensive metabolic panel; Future; Expected date: 07/09/2024  -     Comprehensive metabolic panel            There are no Patient Instructions on file for this visit.    Return in about 3 months (around 7/9/2024) for Annual physical.    Subjective:      Patient ID: Bryan Lee is a 33 y.o. male.    Chief Complaint   Patient presents with   • Establish Care     NP. States that he had a mass removed from his bladder and his catheter was removed yesterday- pending pathology. [facility] Dr [facility]ina, urology at [facility]       New pt here to establish care.  He was evaluated in the ER with gross hematuria, found to have a bladder mass.  He underwent resection by urology last week, catheter was removed yesterday.   Awaiting pathology.    No recurrent hematuria, urinating without difficulty.  Hx psoriasis- was seeing derm in the past.  Did not like taking oral medications, currently managing with topical only.   Was started on a statin for high cholesterol.  He has a significant family history of heart disease, father had CABG in his 40s.           The following portions of the patient's history were reviewed and updated as appropriate: allergies, current medications, past family history, past medical history, past social history, past surgical history and problem list.    Review of  "Systems   Constitutional: Negative.    Respiratory:  Negative for cough, chest tightness and shortness of breath.    Cardiovascular:  Negative for chest pain.   Genitourinary:         See HPI   Neurological:  Negative for dizziness and light-headedness.         Current Outpatient Medications   Medication Sig Dispense Refill   • atorvastatin (LIPITOR) 20 mg tablet Take 1 tablet (20 mg total) by mouth daily with dinner 90 tablet 1   • halobetasol (ULTRAVATE) 0.05 % cream once       No current facility-administered medications for this visit.       Objective:    /100   Pulse 92   Temp 99.3 °F (37.4 °C)   Resp 16   Ht 5' 8.75\" (1.746 m)   Wt 79.8 kg (176 lb)   BMI 26.18 kg/m²        Physical Exam  Vitals and nursing note reviewed.   Constitutional:       Appearance: Normal appearance. He is well-developed.   Cardiovascular:      Rate and Rhythm: Normal rate and regular rhythm.      Pulses: Normal pulses.      Heart sounds: Normal heart sounds. No murmur heard.  Pulmonary:      Effort: Pulmonary effort is normal.      Breath sounds: Normal breath sounds.   Skin:     General: Skin is warm and dry.   Neurological:      Mental Status: He is alert.   Psychiatric:         Mood and Affect: Mood normal.         Behavior: Behavior normal.                SUJIT Conn  "

## 2024-04-09 NOTE — ASSESSMENT & PLAN NOTE
S/p resection by urology through [facility].  Has not yet received pathology results

## 2024-04-10 NOTE — ED PROVIDER NOTES
History  Chief Complaint   Patient presents with    Back Pain     Reports having a mass removed last wednesday from bladder, catheter removed yesterday.  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   Reports blowing nose and felt a pressure/pain travel up from lower left  back to upper back, also reports some paraesthesias to left arm.      34 yo male status post removal of bladder mass last week with urology coming in with left-sided upper back pain.  He reports that the mass was cancerous but non-invasive and is scheduled to undergo repeat scope for follow up in several months to ensure no recurrence.  He does admit to a lot of anxiety surrounding this diagnosis.  He had his catheter removed yesterday, and reports no difficulty urinating but does note some burning pain with urination after the catheter removal.  He also notes a dull ache in his lower back for a few days that has since resolved.  He reports that earlier today, he forcefully blew his nose and then developed a pain in his left upper back, that is described as pleuritic and also reproducible.  Notes some mild shortness of breath while exerting himself today.  Denies fevers, chills, chest pain, abdominal pain, vomiting.  Currently in the ED, he reports feeling very anxious.        Back Pain  Associated symptoms: dysuria    Associated symptoms: no abdominal pain, no chest pain and no fever        Prior to Admission Medications   Prescriptions Last Dose Informant Patient Reported? Taking?   atorvastatin (LIPITOR) 20 mg tablet   No No   Sig: Take 1 tablet (20 mg total) by mouth daily with dinner   halobetasol (ULTRAVATE) 0.05 % cream   Yes No   Sig: once      Facility-Administered Medications: None       Past Medical History:   Diagnosis Date    Drug abuse (HCC) 01/25/2021    Hyperlipidemia        Past Surgical History:   Procedure Laterality Date    BURN DEBRIDEMENT SURGERY      WRIST SURGERY          Family History   Problem Relation Age of Onset    Diabetes Mother     Lopez's disease Mother     Diabetes Father     Heart disease Father         bypass; valves - 40s     I have reviewed and agree with the history as documented.    E-Cigarette/Vaping    E-Cigarette Use Current Every Day User      E-Cigarette/Vaping Substances    Nicotine Yes     THC Yes     CBD No     Flavoring No     Other No     Unknown No      Social History     Tobacco Use    Smoking status: Former     Current packs/day: 1.50     Average packs/day: 1.5 packs/day for 0.5 years (0.8 ttl pk-yrs)     Types: Cigarettes     Start date: 10/1/2023     Quit date: 2002     Passive exposure: Never    Smokeless tobacco: Never   Vaping Use    Vaping status: Every Day    Substances: Nicotine, THC   Substance Use Topics    Alcohol use: Not Currently     Comment: not since oct    Drug use: Yes     Types: Marijuana        Review of Systems   Constitutional:  Negative for chills and fever.   HENT:  Negative for ear pain and sore throat.    Eyes:  Negative for pain and visual disturbance.   Respiratory:  Positive for shortness of breath. Negative for cough.    Cardiovascular:  Negative for chest pain and palpitations.   Gastrointestinal:  Negative for abdominal pain and vomiting.   Genitourinary:  Positive for dysuria. Negative for hematuria.   Musculoskeletal:  Positive for back pain. Negative for arthralgias.   Skin:  Negative for color change and rash.   Neurological:  Negative for seizures and syncope.   All other systems reviewed and are negative.      Physical Exam  ED Triage Vitals [04/09/24 1953]   Temperature Pulse Respirations Blood Pressure SpO2   98.8 °F (37.1 °C) 82 18 (!) 187/115 98 %      Temp Source Heart Rate Source Patient Position - Orthostatic VS BP Location FiO2 (%)   Oral Monitor Sitting Right arm --      Pain Score       --             Orthostatic Vital Signs  Vitals:    04/09/24 1953 04/09/24 2254   BP: (!) 187/115 137/78    Pulse: 82 65   Patient Position - Orthostatic VS: Sitting Lying       Physical Exam  Vitals and nursing note reviewed.   Constitutional:       General: He is not in acute distress.     Appearance: Normal appearance. He is well-developed. He is not ill-appearing.   HENT:      Head: Normocephalic and atraumatic.      Mouth/Throat:      Mouth: Mucous membranes are moist.   Eyes:      Conjunctiva/sclera: Conjunctivae normal.   Cardiovascular:      Rate and Rhythm: Normal rate and regular rhythm.      Heart sounds: No murmur heard.  Pulmonary:      Effort: Pulmonary effort is normal. No respiratory distress.      Breath sounds: Normal breath sounds.   Abdominal:      General: Abdomen is flat.      Palpations: Abdomen is soft.      Tenderness: There is no abdominal tenderness. There is no right CVA tenderness, left CVA tenderness, guarding or rebound.   Musculoskeletal:         General: No swelling.      Cervical back: Neck supple.      Comments: Area of reproducible tenderness to left upper back just inferior to scapula, no overlying skin changes, no midline tenderness    Skin:     General: Skin is warm and dry.      Capillary Refill: Capillary refill takes less than 2 seconds.   Neurological:      General: No focal deficit present.      Mental Status: He is alert.      Sensory: No sensory deficit.      Motor: No weakness.         ED Medications  Medications   diazepam (VALIUM) tablet 5 mg (5 mg Oral Given 4/9/24 2319)       Diagnostic Studies  Results Reviewed       Procedure Component Value Units Date/Time    Urine Microscopic [[id number]]  (Abnormal) Collected: 04/09/24 2154    Lab Status: Final result Specimen: Urine, Clean Catch Updated: 04/09/24 2216     RBC, UA 10-20 /hpf      WBC, UA 4-10 /hpf      Epithelial Cells None Seen /hpf      Bacteria, UA None Seen /hpf     UA w Reflex to Microscopic w Reflex to Culture [[id number]]  (Abnormal) Collected: 04/09/24 2154    Lab Status: Final result Specimen: Urine, Clean  Catch Updated: 04/09/24 2206     Color, UA Light Yellow     Clarity, UA Clear     Specific Gravity, UA 1.016     pH, UA 5.5     Leukocytes, UA Negative     Nitrite, UA Negative     Protein, UA Negative mg/dl      Glucose, UA Negative mg/dl      Ketones, UA Negative mg/dl      Urobilinogen, UA <2.0 mg/dl      Bilirubin, UA Negative     Occult Blood, UA Trace    D-Dimer [[id number]]  (Normal) Collected: 04/09/24 2106    Lab Status: Final result Specimen: Blood from Arm, Right Updated: 04/09/24 2156     D-Dimer, Quant <0.27 ug/ml FEU     HS Troponin 0hr (reflex protocol) [[id number]]  (Normal) Collected: 04/09/24 2106    Lab Status: Final result Specimen: Blood from Arm, Right Updated: 04/09/24 2136     hs TnI 0hr <2 ng/L     Comprehensive metabolic panel [[id number]] Collected: 04/09/24 2106    Lab Status: Final result Specimen: Blood from Arm, Right Updated: 04/09/24 2129     Sodium 137 mmol/L      Potassium 4.0 mmol/L      Chloride 104 mmol/L      CO2 26 mmol/L      ANION GAP 7 mmol/L      BUN 16 mg/dL      Creatinine 0.87 mg/dL      Glucose 95 mg/dL      Calcium 9.0 mg/dL      AST 16 U/L      ALT 17 U/L      Alkaline Phosphatase 62 U/L      Total Protein 6.9 g/dL      Albumin 4.4 g/dL      Total Bilirubin 0.52 mg/dL      eGFR 111 ml/min/1.73sq m     Narrative:      National Kidney Disease Foundation guidelines for Chronic Kidney Disease (CKD):     Stage 1 with normal or high GFR (GFR > 90 mL/min/1.73 square meters)    Stage 2 Mild CKD (GFR = 60-89 mL/min/1.73 square meters)    Stage 3A Moderate CKD (GFR = 45-59 mL/min/1.73 square meters)    Stage 3B Moderate CKD (GFR = 30-44 mL/min/1.73 square meters)    Stage 4 Severe CKD (GFR = 15-29 mL/min/1.73 square meters)    Stage 5 End Stage CKD (GFR <15 mL/min/1.73 square meters)  Note: GFR calculation is accurate only with a steady state creatinine    CBC and differential [[id number]] Collected: 04/09/24 2106    Lab Status: Final result Specimen: Blood from Arm, Right  Updated: 04/09/24 2115     WBC 9.08 Thousand/uL      RBC 4.59 Million/uL      Hemoglobin 13.7 g/dL      Hematocrit 40.1 %      MCV 87 fL      MCH 29.8 pg      MCHC 34.2 g/dL      RDW 12.0 %      MPV 8.9 fL      Platelets 248 Thousands/uL      nRBC 0 /100 WBCs      Segmented % 62 %      Immature Grans % 0 %      Lymphocytes % 23 %      Monocytes % 9 %      Eosinophils Relative 5 %      Basophils Relative 1 %      Absolute Neutrophils 5.72 Thousands/µL      Absolute Immature Grans 0.03 Thousand/uL      Absolute Lymphocytes 2.05 Thousands/µL      Absolute Monocytes 0.77 Thousand/µL      Eosinophils Absolute 0.46 Thousand/µL      Basophils Absolute 0.05 Thousands/µL                    XR chest 2 views   ED Interpretation by Joelle Justin MD (04/09 2154)   No acute disease            Procedures  Procedures      ED Course                                       Medical Decision Making  32 yo male status post removal of bladder mass last week with urology coming in with left-sided upper back pain and anxiety.  Physical exam overall unremarkable.  EKG non-ischemic.  Given recent surgical procedure for removal of reported malignant mass, dimer obtained to evaluate for PE which was negative.  Workup otherwise unremarkable.  Reports symptomatic improvement following Valium.  Reviewed return precautions.  Recommended follow up with PCP and urology.  Stable for discharge.      Amount and/or Complexity of Data Reviewed  Labs: ordered.  Radiology: ordered and independent interpretation performed.    Risk  Prescription drug management.          Disposition  Final diagnoses:   Upper back pain on left side   Anxiety     Time reflects when diagnosis was documented in both MDM as applicable and the Disposition within this note       Time User Action Codes Description Comment    4/9/2024 10:56 PM Joelle Justin Add [M54.9] Upper back pain on left side     4/9/2024 10:56 PM Joelle Justin Add [F41.9] Anxiety           ED Disposition        ED Disposition   Discharge    Condition   Stable    Date/Time   Tue Apr 9, 2024 10:55 PM    Comment   Bryan Lee discharge to home/self care.                   Follow-up Information       Follow up With Specialties Details Why Contact Info Additional Information    [facility], Nurse Practitioner Schedule an appointment as soon as possible for a visit   [address] [facility] [address]  [phone]       [facility] Emergency Department Emergency Medicine  As needed, If symptoms worsen [facility] [facility] [address]  [phone] [facility] Emergency Department, [facility] [place], [place], [address]            Discharge Medication List as of 4/9/2024 11:32 PM        CONTINUE these medications which have NOT CHANGED    Details   atorvastatin (LIPITOR) 20 mg tablet Take 1 tablet (20 mg total) by mouth daily with dinner, Starting Tue 4/9/2024, Normal      halobetasol (ULTRAVATE) 0.05 % cream once, Starting Fri 2/16/2024, Historical Med           No discharge procedures on file.    PDMP Review       None             ED Provider  Attending physically available and evaluated Bryan Lee. I managed the patient along with the ED Attending.    Electronically Signed by           Joelle Justin MD  04/10/24 0514

## 2024-04-10 NOTE — ED ATTENDING ATTESTATION
4/9/2024  I, Rory Harrison MD, saw and evaluated the patient. I have discussed the patient with the resident/non-physician practitioner and agree with the resident's/non-physician practitioner's findings, Plan of Care, and MDM as documented in the resident's/non-physician practitioner's note, except where noted. All available labs and Radiology studies were reviewed.  I was present for key portions of any procedure(s) performed by the resident/non-physician practitioner and I was immediately available to provide assistance.       At this point I agree with the current assessment done in the Emergency Department.  I have conducted an independent evaluation of this patient a history and physical is as follows:    33-year-old male with recent history of bladder mass removal, Theodore removal yesterday presented for evaluation after developing acute pain in the left mid back/flank today.  Stated pain is worse with inspiration as well as movement.  No fever, chills, shortness of breath, chest pain.  No difficulty urinating but does have some dysuria since catheter removal.  On exam his breath sounds are clear.  Has some mild tenderness in the left mid to upper back.    ED Course  ED Course as of 04/09/24 2249   Tue Apr 09, 2024   2152 CXR unremarkable.     Lab work unremarkable including troponin, D-dimer.  EKG and chest x-ray within normal limits.  Patient was given Valium with improvement in suspected muscle spasm as well as anxiety.  Stable for discharge home.    Critical Care Time  Procedures

## 2024-04-11 ENCOUNTER — OFFICE VISIT (OUTPATIENT)
Age: 34
End: 2024-04-11

## 2024-04-11 VITALS
HEIGHT: 69 IN | BODY MASS INDEX: 25.92 KG/M2 | WEIGHT: 175 LBS | DIASTOLIC BLOOD PRESSURE: 112 MMHG | HEART RATE: 84 BPM | SYSTOLIC BLOOD PRESSURE: 158 MMHG

## 2024-04-11 DIAGNOSIS — M79.609 PAIN DUE TO ONYCHOMYCOSIS OF NAIL: ICD-10-CM

## 2024-04-11 DIAGNOSIS — B35.1 PAIN DUE TO ONYCHOMYCOSIS OF NAIL: ICD-10-CM

## 2024-04-11 DIAGNOSIS — B35.1 ONYCHOMYCOSIS: Primary | ICD-10-CM

## 2024-04-11 PROCEDURE — 88312 SPECIAL STAINS GROUP 1: CPT | Performed by: STUDENT IN AN ORGANIZED HEALTH CARE EDUCATION/TRAINING PROGRAM

## 2024-04-11 PROCEDURE — 88305 TISSUE EXAM BY PATHOLOGIST: CPT | Performed by: STUDENT IN AN ORGANIZED HEALTH CARE EDUCATION/TRAINING PROGRAM

## 2024-04-11 NOTE — PROGRESS NOTES
"This patient was seen on  4/11/24 .    My role is Foot , Ankle, and Wound Specialist    ASSESSMENT     Diagnoses and all orders for this visit:    Onychomycosis  -     Hepatic function panel; Future  -     Hepatic function panel  -     Tissue Exam    Pain due to onychomycosis of nail         Problem List Items Addressed This Visit    None  Visit Diagnoses       Onychomycosis    -  Primary    Relevant Orders    Hepatic function panel    Tissue Exam    Pain due to onychomycosis of nail              PLAN  -Patient was educated regarding their condition  -Biopsy of nail sent for PAS stain  -If the cultures come back positive, we will pursue a course of PO terbinafine  -F/u hepatic function panel in anticipation of terbinafine treatment  -RTC in 6-months     SUBJECTIVE    Chief Complaint:  Elongated, painful, discolored toenails     Patient ID: Bryan Lee     4/11/2024: Bryan is a pleasant 33-year-old male who presents today for evaluation of his elongated toenails.  He states that they are thick, discolored, and painful.  He states that they make wearing shoes difficult.  He states he is unable to trim these by himself.        The following portions of the patient's history were reviewed and updated as appropriate: allergies, current medications, past family history, past medical history, past social history, past surgical history and problem list.    Review of Systems   Constitutional: Negative.    Respiratory: Negative.     Cardiovascular: Negative.    Gastrointestinal: Negative.    Genitourinary: Negative.    Musculoskeletal: Negative.    Skin:  Positive for color change.         OBJECTIVE      BP (!) 158/112   Pulse 84   Ht 5' 8.75\" (1.746 m)   Wt 79.4 kg (175 lb)   BMI 26.03 kg/m²        Physical Exam  Constitutional:       Appearance: Normal appearance.   HENT:      Head: Normocephalic and atraumatic.   Eyes:      General:         Right eye: No discharge.         Left eye: No discharge. "   Cardiovascular:      Rate and Rhythm: Normal rate and regular rhythm.      Pulses:           Dorsalis pedis pulses are 2+ on the right side and 2+ on the left side.        Posterior tibial pulses are 2+ on the right side and 2+ on the left side.   Pulmonary:      Effort: Pulmonary effort is normal.      Breath sounds: Normal breath sounds.   Skin:     General: Skin is warm.      Capillary Refill: Capillary refill takes less than 2 seconds.   Neurological:      Mental Status: He is alert and oriented to person, place, and time.      Sensory: Sensation is intact. No sensory deficit.   Psychiatric:         Mood and Affect: Mood normal.       Vascular:  -DP and PT pulses intact b/l  -Capillary refill time <2 sec b/l    MSK:  -Pain on palpation positive to bilateral digits x 10  -No gross deformities noted   -MMT is 5/5 to all muscle compartments of the lower extremity    Neuro:  -Light sensation intact bilaterally  -Protective sensation intact bilaterally    Derm:  -No lesions, abrasions, or open wounds noted  -No noted interdigital maceration, peeling, malodor  -Patient's toe nails x10 are elongated, thickened, discolored, and dystrophic with subungual debris: These findings are consistent with onychomycosis

## 2024-04-12 ENCOUNTER — OFFICE VISIT (OUTPATIENT)
Dept: FAMILY MEDICINE CLINIC | Facility: CLINIC | Age: 34
End: 2024-04-12
Payer: COMMERCIAL

## 2024-04-12 VITALS
RESPIRATION RATE: 18 BRPM | WEIGHT: 170 LBS | DIASTOLIC BLOOD PRESSURE: 118 MMHG | SYSTOLIC BLOOD PRESSURE: 162 MMHG | HEART RATE: 87 BPM | TEMPERATURE: 99.1 F | BODY MASS INDEX: 25.29 KG/M2

## 2024-04-12 DIAGNOSIS — R22.2 MASS IN CHEST: Primary | ICD-10-CM

## 2024-04-12 DIAGNOSIS — R07.9 CHEST PAIN, UNSPECIFIED TYPE: ICD-10-CM

## 2024-04-12 DIAGNOSIS — N32.89 BLADDER MASS: ICD-10-CM

## 2024-04-12 PROCEDURE — 99213 OFFICE O/P EST LOW 20 MIN: CPT | Performed by: FAMILY MEDICINE

## 2024-04-12 NOTE — PROGRESS NOTES
Assessment/Plan:    1. Mass in chest  -     CT chest w contrast; Future; Expected date: 04/12/2024    2. Chest pain, unspecified type    3. Bladder mass        Pt advised on continuity - made appt with first availble not PCP - educated  I will order ct scan at pt's request - pt is tender and jumping upin palpating area in question  No erythema present  Pt is anxious over the mass he palpated and fee;ls that is why his BP is elevated.  I do not palpate mass  but he felt it yesterday and again he is tender in the area in question  Pt is concerned about the blood he had before his bladder surgery - Again I am unfamiliar with the pt but painless hematuria of the bladder would be consistent with his dx of bladder cancer.     There are no Patient Instructions on file for this visit.    No follow-ups on file.    Subjective:      Patient ID: Bryan Lee is a 33 y.o. male.    Chief Complaint   Patient presents with   • Mass     Sas/cma       Pt unknown to me here for an ED follow up    Pt styates yesterday he felt a lump in his rt axcuilla but does not feel it today    Pt states he does not feel it today but states now he feels his blood is pumping in his chest  Pt states he feels he should have a ct scan of his chest with contrast.    Pt states he was a heavy drug user and states he feels a chunk of tumor traveling up his chest.     So basically opt states he thinks he has a mass in his chest traveling from his bladder.      Pt states he has discomfort in his left chest          The following portions of the patient's history were reviewed and updated as appropriate: allergies, current medications, past family history, past medical history, past social history, past surgical history and problem list.    Review of Systems   Skin:         Mass in chest left lower axcilla         Current Outpatient Medications   Medication Sig Dispense Refill   • atorvastatin (LIPITOR) 20 mg tablet Take 1 tablet (20 mg total) by mouth  daily with dinner 90 tablet 1   • halobetasol (ULTRAVATE) 0.05 % cream once       No current facility-administered medications for this visit.       Objective:    BP (!) 162/118   Pulse 87   Temp 99.1 °F (37.3 °C)   Resp 18   Wt 77.1 kg (170 lb)   BMI 25.29 kg/m²        Physical Exam  Skin:     Comments: Upon palpation of left lower axcilla pt is jumping in pain  No masses palpated per say - pt states he had one yesterday  No erythema                  Frank Lombardi, DO

## 2024-04-15 ENCOUNTER — TELEPHONE (OUTPATIENT)
Dept: OBGYN CLINIC | Facility: CLINIC | Age: 34
End: 2024-04-15

## 2024-04-15 PROCEDURE — 88312 SPECIAL STAINS GROUP 1: CPT | Performed by: STUDENT IN AN ORGANIZED HEALTH CARE EDUCATION/TRAINING PROGRAM

## 2024-04-15 PROCEDURE — 88305 TISSUE EXAM BY PATHOLOGIST: CPT | Performed by: STUDENT IN AN ORGANIZED HEALTH CARE EDUCATION/TRAINING PROGRAM

## 2024-04-15 NOTE — TELEPHONE ENCOUNTER
----- Message from Jd Valadez DPM sent at 4/15/2024 12:43 PM EDT -----  Can you please let Bryan know that his nail sample was negative for fungus. Thanks  ----- Message -----  From: Lab, Background User  Sent: 4/15/2024  10:45 AM EDT  To: Jd Valadez DPM

## 2024-04-16 ENCOUNTER — HOSPITAL ENCOUNTER (EMERGENCY)
Facility: HOSPITAL | Age: 34
Discharge: HOME | End: 2024-04-16
Attending: EMERGENCY MEDICINE
Payer: COMMERCIAL

## 2024-04-16 ENCOUNTER — APPOINTMENT (EMERGENCY)
Dept: CT IMAGING | Facility: HOSPITAL | Age: 34
End: 2024-04-16
Payer: COMMERCIAL

## 2024-04-16 VITALS
RESPIRATION RATE: 18 BRPM | OXYGEN SATURATION: 99 % | SYSTOLIC BLOOD PRESSURE: 157 MMHG | HEART RATE: 86 BPM | TEMPERATURE: 98.7 F | DIASTOLIC BLOOD PRESSURE: 110 MMHG

## 2024-04-16 DIAGNOSIS — R07.89 LEFT-SIDED CHEST WALL PAIN: Primary | ICD-10-CM

## 2024-04-16 LAB
ALBUMIN SERPL BCP-MCNC: 4.5 G/DL (ref 3.5–5)
ALP SERPL-CCNC: 59 U/L (ref 34–104)
ALT SERPL W P-5'-P-CCNC: 15 U/L (ref 7–52)
ANION GAP SERPL CALCULATED.3IONS-SCNC: 6 MMOL/L (ref 4–13)
APTT PPP: 31 SECONDS (ref 23–37)
AST SERPL W P-5'-P-CCNC: 15 U/L (ref 13–39)
BASOPHILS # BLD AUTO: 0.03 THOUSANDS/ÂΜL (ref 0–0.1)
BASOPHILS NFR BLD AUTO: 0 % (ref 0–1)
BILIRUB SERPL-MCNC: 0.66 MG/DL (ref 0.2–1)
BUN SERPL-MCNC: 17 MG/DL (ref 5–25)
CALCIUM SERPL-MCNC: 9.1 MG/DL (ref 8.4–10.2)
CARDIAC TROPONIN I PNL SERPL HS: <2 NG/L
CHLORIDE SERPL-SCNC: 104 MMOL/L (ref 96–108)
CO2 SERPL-SCNC: 27 MMOL/L (ref 21–32)
CREAT SERPL-MCNC: 0.88 MG/DL (ref 0.6–1.3)
EOSINOPHIL # BLD AUTO: 0.29 THOUSAND/ÂΜL (ref 0–0.61)
EOSINOPHIL NFR BLD AUTO: 4 % (ref 0–6)
ERYTHROCYTE [DISTWIDTH] IN BLOOD BY AUTOMATED COUNT: 12.1 % (ref 11.6–15.1)
GFR SERPL CREATININE-BSD FRML MDRD: 111 ML/MIN/1.73SQ M
GLUCOSE SERPL-MCNC: 111 MG/DL (ref 65–140)
HCT VFR BLD AUTO: 38.6 % (ref 36.5–49.3)
HGB BLD-MCNC: 13.3 G/DL (ref 12–17)
IMM GRANULOCYTES # BLD AUTO: 0.01 THOUSAND/UL (ref 0–0.2)
IMM GRANULOCYTES NFR BLD AUTO: 0 % (ref 0–2)
INR PPP: 0.96 (ref 0.84–1.19)
LIPASE SERPL-CCNC: 28 U/L (ref 11–82)
LYMPHOCYTES # BLD AUTO: 2.88 THOUSANDS/ÂΜL (ref 0.6–4.47)
LYMPHOCYTES NFR BLD AUTO: 37 % (ref 14–44)
MCH RBC QN AUTO: 29.9 PG (ref 26.8–34.3)
MCHC RBC AUTO-ENTMCNC: 34.5 G/DL (ref 31.4–37.4)
MCV RBC AUTO: 87 FL (ref 82–98)
MONOCYTES # BLD AUTO: 0.66 THOUSAND/ÂΜL (ref 0.17–1.22)
MONOCYTES NFR BLD AUTO: 8 % (ref 4–12)
NEUTROPHILS # BLD AUTO: 3.99 THOUSANDS/ÂΜL (ref 1.85–7.62)
NEUTS SEG NFR BLD AUTO: 51 % (ref 43–75)
NRBC BLD AUTO-RTO: 0 /100 WBCS
PLATELET # BLD AUTO: 250 THOUSANDS/UL (ref 149–390)
PMV BLD AUTO: 9.1 FL (ref 8.9–12.7)
POTASSIUM SERPL-SCNC: 3.9 MMOL/L (ref 3.5–5.3)
PROT SERPL-MCNC: 6.9 G/DL (ref 6.4–8.4)
PROTHROMBIN TIME: 13.4 SECONDS (ref 11.6–14.5)
RBC # BLD AUTO: 4.45 MILLION/UL (ref 3.88–5.62)
SODIUM SERPL-SCNC: 137 MMOL/L (ref 135–147)
WBC # BLD AUTO: 7.86 THOUSAND/UL (ref 4.31–10.16)

## 2024-04-16 PROCEDURE — 99285 EMERGENCY DEPT VISIT HI MDM: CPT | Performed by: PHYSICIAN ASSISTANT

## 2024-04-16 PROCEDURE — 93005 ELECTROCARDIOGRAM TRACING: CPT

## 2024-04-16 PROCEDURE — 36415 COLL VENOUS BLD VENIPUNCTURE: CPT | Performed by: PHYSICIAN ASSISTANT

## 2024-04-16 PROCEDURE — 80053 COMPREHEN METABOLIC PANEL: CPT | Performed by: PHYSICIAN ASSISTANT

## 2024-04-16 PROCEDURE — 71275 CT ANGIOGRAPHY CHEST: CPT

## 2024-04-16 PROCEDURE — 84484 ASSAY OF TROPONIN QUANT: CPT | Performed by: PHYSICIAN ASSISTANT

## 2024-04-16 PROCEDURE — 85730 THROMBOPLASTIN TIME PARTIAL: CPT | Performed by: PHYSICIAN ASSISTANT

## 2024-04-16 PROCEDURE — 83690 ASSAY OF LIPASE: CPT | Performed by: PHYSICIAN ASSISTANT

## 2024-04-16 PROCEDURE — 99285 EMERGENCY DEPT VISIT HI MDM: CPT

## 2024-04-16 PROCEDURE — 85610 PROTHROMBIN TIME: CPT | Performed by: PHYSICIAN ASSISTANT

## 2024-04-16 PROCEDURE — 85025 COMPLETE CBC W/AUTO DIFF WBC: CPT | Performed by: PHYSICIAN ASSISTANT

## 2024-04-16 RX ORDER — KETOROLAC TROMETHAMINE 30 MG/ML
15 INJECTION, SOLUTION INTRAMUSCULAR; INTRAVENOUS ONCE
Status: DISCONTINUED | OUTPATIENT
Start: 2024-04-16 | End: 2024-04-17 | Stop reason: HOSPADM

## 2024-04-16 RX ADMIN — IOHEXOL 85 ML: 350 INJECTION, SOLUTION INTRAVENOUS at 21:27

## 2024-04-16 NOTE — Clinical Note
Bryan Lee was seen and treated in our emergency department on 4/16/2024.                Diagnosis:     Bryan  .    He may return on this date: 04/18/2024         If you have any questions or concerns, please don't hesitate to call.      Kole Ruiz PA-C    ______________________________           _______________          _______________  Hospital Representative                              Date                                Time

## 2024-04-17 NOTE — DISCHARGE INSTRUCTIONS
Susan Pabon MD  [phone] 4/16/2024      Narrative & Impression  CTA - CHEST WITH IV CONTRAST - PULMONARY ANGIOGRAM     INDICATION: left pleuritic chest pain; hx of bladder mass.     COMPARISON: CT abdomen and pelvis on 1/31/2024.     TECHNIQUE: CTA examination of the chest was performed using angiographic technique according to a protocol specifically tailored to evaluate for pulmonary embolism. Multiplanar 2D reformatted images were created from the source data. In addition, coronal   3D MIP postprocessing was performed on the acquisition scanner.     Radiation dose length product (DLP) for this visit: 388 mGy-cm . This examination, like all CT scans performed in the [facility], was performed utilizing techniques to minimize radiation dose exposure, including the use of iterative   reconstruction and automated exposure control.     IV Contrast: 85 mL of iohexol (OMNIPAQUE)     FINDINGS:     PULMONARY ARTERIAL TREE: No pulmonary embolus is seen.     LUNGS: Minimal bibasilar atelectasis. No focal consolidation. There is no tracheal or endobronchial lesion.     PLEURA: Unremarkable.     HEART/GREAT VESSELS: Heart is unremarkable for patient's age. No thoracic aortic aneurysm.     MEDIASTINUM AND RENÉE: Unremarkable.     CHEST WALL AND LOWER NECK: Unremarkable.     VISUALIZED STRUCTURES IN THE UPPER ABDOMEN: Redemonstration of symmetrically calcified bilateral adrenal glands.     OSSEOUS STRUCTURES: No acute fracture or destructive osseous lesion.     IMPRESSION:     No evidence of pulmonary embolus.                 Workstation performed: [id number]              Specimen Collected: 04/16/24 22:56 Last Resulted: 04/16/24 23:13

## 2024-04-17 NOTE — ED PROVIDER NOTES
"      History  Chief Complaint   Patient presents with    Post-op Problem     Reports left lateral upper side pain, pain right below arm pit, reports seen last week for same but pain has worsened.   Reports pain with movement and with breathing.  Reports has a mass removed from his lung 2 weeks ago. Hx drug abuse.  +SOB    Shortness of Breath     Patient is a 33-year-old male with a history of hyperlipidemia, status past removal of bladder mass approximately 2 weeks ago that presents to the ED with sharp shooting nonradiating left-sided chest pain and shortness of breath symptoms for about 2 weeks.  Patient patient states that he had a bladder mass that was removed that was noncancerous via his report from the urologist.  Patient was recently evaluated for symptoms similar to current on 4/9/2024 largely unremarkable with chest x-ray with no acute disease by a physician at that ED visit, 4/9/2024 with return precautions in place, following up with PCP and urology and patient being stable for discharge.  Patient with PCP visit on 4/12/2024 with concern of mass in his right axilla and \"at times I can feel my blood pumping down my arm.\"  Patient denies palliative factors with provocative factors of pressure to left side of chest.  Patient has not effective treatment.  Patient denies fevers, chills, nausea, vomiting, diarrhea, constipation and urinary symptoms.  Patient denies recent fall and recent trauma.  Patient denies sick contacts and recent travel.  Patient does report shortness of breath and chest pain symptoms but denies abdominal pain.        History provided by:  Patient   used: No    Shortness of Breath  Associated symptoms: chest pain    Associated symptoms: no abdominal pain, no cough, no ear pain, no fever, no headaches, no neck pain, no rash, no sore throat and no vomiting        Prior to Admission Medications   Prescriptions Last Dose Informant Patient Reported? Taking?   atorvastatin " (LIPITOR) 20 mg tablet   No No   Sig: Take 1 tablet (20 mg total) by mouth daily with dinner   halobetasol (ULTRAVATE) 0.05 % cream   Yes No   Sig: once      Facility-Administered Medications: None       Past Medical History:   Diagnosis Date    Drug abuse (HCC) 01/25/2021    Hyperlipidemia        Past Surgical History:   Procedure Laterality Date    BURN DEBRIDEMENT SURGERY      WRIST SURGERY         Family History   Problem Relation Age of Onset    Diabetes Mother     Perez's disease Mother     Diabetes Father     Heart disease Father         bypass; valves - 40s     I have reviewed and agree with the history as documented.    E-Cigarette/Vaping    E-Cigarette Use Former User     Start Date 10/2/23     Quit Date 10/14/23      E-Cigarette/Vaping Substances    Nicotine Yes     THC Yes     CBD No     Flavoring Yes     Other No     Unknown No      Social History     Tobacco Use    Smoking status: Former     Current packs/day: 1.50     Average packs/day: 1.5 packs/day for 0.5 years (0.8 ttl pk-yrs)     Types: Cigarettes     Start date: 10/1/2023     Quit date: 2002     Passive exposure: Never    Smokeless tobacco: Never   Vaping Use    Vaping status: Former    Start date: 10/2/2023    Quit date: 10/14/2023    Substances: Nicotine, THC, Flavoring   Substance Use Topics    Alcohol use: Not Currently     Comment: not since oct    Drug use: Yes     Types: Marijuana, Cocaine       Review of Systems   Constitutional:  Negative for activity change, appetite change, chills and fever.   HENT:  Negative for congestion, ear pain, postnasal drip, rhinorrhea, sinus pressure, sinus pain, sore throat and tinnitus.    Eyes:  Negative for photophobia, pain and visual disturbance.   Respiratory:  Positive for shortness of breath. Negative for cough and chest tightness.    Cardiovascular:  Positive for chest pain. Negative for palpitations.   Gastrointestinal:  Negative for abdominal pain, constipation, diarrhea, nausea and  vomiting.   Genitourinary:  Negative for difficulty urinating, dysuria, flank pain, frequency, hematuria and urgency.   Musculoskeletal:  Negative for arthralgias, back pain, gait problem, neck pain and neck stiffness.   Skin:  Negative for color change, pallor and rash.   Allergic/Immunologic: Negative for environmental allergies and food allergies.   Neurological:  Negative for dizziness, seizures, syncope, weakness, numbness and headaches.   Psychiatric/Behavioral:  Negative for confusion.    All other systems reviewed and are negative.      Physical Exam  Physical Exam  Vitals and nursing note reviewed.   Constitutional:       General: He is awake. He is not in acute distress.     Appearance: Normal appearance. He is well-developed and normal weight. He is not ill-appearing, toxic-appearing or diaphoretic.      Comments: BP (!) 157/110   Pulse 86   Temp 98.7 °F (37.1 °C) (Oral)   Resp 18   SpO2 99%      HENT:      Head: Normocephalic and atraumatic.      Jaw: There is normal jaw occlusion.      Right Ear: Hearing, tympanic membrane and external ear normal. No decreased hearing noted. No drainage, swelling or tenderness. No mastoid tenderness.      Left Ear: Hearing, tympanic membrane and external ear normal. No decreased hearing noted. No drainage, swelling or tenderness. No mastoid tenderness.      Nose: Nose normal.      Mouth/Throat:      Lips: Pink.      Mouth: Mucous membranes are moist.      Pharynx: Oropharynx is clear. Uvula midline.   Eyes:      General: Lids are normal. Vision grossly intact. Gaze aligned appropriately.         Right eye: No discharge.         Left eye: No discharge.      Extraocular Movements: Extraocular movements intact.      Conjunctiva/sclera: Conjunctivae normal.      Pupils: Pupils are equal, round, and reactive to light.   Neck:      Vascular: No JVD.      Trachea: Trachea and phonation normal. No tracheal tenderness or tracheal deviation.   Cardiovascular:      Rate and  Rhythm: Normal rate and regular rhythm.      Pulses: Normal pulses.           Radial pulses are 2+ on the right side and 2+ on the left side.        Posterior tibial pulses are 2+ on the right side and 2+ on the left side.      Heart sounds: Normal heart sounds. No murmur heard.  Pulmonary:      Effort: Pulmonary effort is normal. No respiratory distress.      Breath sounds: Normal breath sounds and air entry. No stridor. No decreased breath sounds, wheezing, rhonchi or rales.   Chest:      Chest wall: Tenderness present.          Comments: Patient states that patient has left-sided chest wall tenderness when he coughs and and with manual mechanical pressure.  No crepitus, no flail chest; skin with no new lesions or rashes.  Abdominal:      General: Abdomen is flat. Bowel sounds are normal. There is no distension.      Palpations: Abdomen is soft. Abdomen is not rigid.      Tenderness: There is no abdominal tenderness. There is no guarding or rebound.   Musculoskeletal:         General: No swelling. Normal range of motion.      Cervical back: Full passive range of motion without pain, normal range of motion and neck supple. No rigidity. No spinous process tenderness or muscular tenderness. Normal range of motion.   Feet:      Right foot:      Toenail Condition: Right toenails are normal.      Left foot:      Toenail Condition: Left toenails are normal.   Lymphadenopathy:      Head:      Right side of head: No submental, submandibular, tonsillar, preauricular, posterior auricular or occipital adenopathy.      Left side of head: No submental, submandibular, tonsillar, preauricular, posterior auricular or occipital adenopathy.      Cervical: No cervical adenopathy.      Right cervical: No superficial, deep or posterior cervical adenopathy.     Left cervical: No superficial, deep or posterior cervical adenopathy.   Skin:     General: Skin is warm and dry.      Capillary Refill: Capillary refill takes less than 2  seconds.      Findings: No rash.   Neurological:      General: No focal deficit present.      Mental Status: He is alert and oriented to person, place, and time. Mental status is at baseline.      GCS: GCS eye subscore is 4. GCS verbal subscore is 5. GCS motor subscore is 6.      Cranial Nerves: Cranial nerves 2-12 are intact.      Sensory: No sensory deficit.      Deep Tendon Reflexes:      Reflex Scores:       Patellar reflexes are 2+ on the left side.  Psychiatric:         Attention and Perception: Attention normal.         Mood and Affect: Mood normal.         Speech: Speech normal.         Behavior: Behavior normal. Behavior is cooperative.         Thought Content: Thought content normal.         Judgment: Judgment normal.         Vital Signs  ED Triage Vitals [04/16/24 2010]   Temperature Pulse Respirations Blood Pressure SpO2   98.7 °F (37.1 °C) 86 18 (!) 157/110 99 %      Temp Source Heart Rate Source Patient Position - Orthostatic VS BP Location FiO2 (%)   Oral Monitor -- -- --      Pain Score       5           Vitals:    04/16/24 2010   BP: (!) 157/110   Pulse: 86         Visual Acuity      ED Medications  Medications   ketorolac (TORADOL) injection 15 mg (15 mg Intravenous Not Given 4/16/24 2046)   iohexol (OMNIPAQUE) 350 MG/ML injection (MULTI-DOSE) 85 mL (85 mL Intravenous Given 4/16/24 2127)       Diagnostic Studies  Results Reviewed       Procedure Component Value Units Date/Time    HS Troponin 0hr (reflex protocol) [[id number]]  (Normal) Collected: 04/16/24 2046    Lab Status: Final result Specimen: Blood from Arm, Right Updated: 04/16/24 2117     hs TnI 0hr <2 ng/L     Comprehensive metabolic panel [[id number]] Collected: 04/16/24 2046    Lab Status: Final result Specimen: Blood from Arm, Right Updated: 04/16/24 2108     Sodium 137 mmol/L      Potassium 3.9 mmol/L      Chloride 104 mmol/L      CO2 27 mmol/L      ANION GAP 6 mmol/L      BUN 17 mg/dL      Creatinine 0.88 mg/dL      Glucose 111 mg/dL       Calcium 9.1 mg/dL      AST 15 U/L      ALT 15 U/L      Alkaline Phosphatase 59 U/L      Total Protein 6.9 g/dL      Albumin 4.5 g/dL      Total Bilirubin 0.66 mg/dL      eGFR 111 ml/min/1.73sq m     Narrative:      National Kidney Disease Foundation guidelines for Chronic Kidney Disease (CKD):     Stage 1 with normal or high GFR (GFR > 90 mL/min/1.73 square meters)    Stage 2 Mild CKD (GFR = 60-89 mL/min/1.73 square meters)    Stage 3A Moderate CKD (GFR = 45-59 mL/min/1.73 square meters)    Stage 3B Moderate CKD (GFR = 30-44 mL/min/1.73 square meters)    Stage 4 Severe CKD (GFR = 15-29 mL/min/1.73 square meters)    Stage 5 End Stage CKD (GFR <15 mL/min/1.73 square meters)  Note: GFR calculation is accurate only with a steady state creatinine    Lipase [[id number]]  (Normal) Collected: 04/16/24 2046    Lab Status: Final result Specimen: Blood from Arm, Right Updated: 04/16/24 2108     Lipase 28 u/L     Protime-INR [[id number]]  (Normal) Collected: 04/16/24 2046    Lab Status: Final result Specimen: Blood from Arm, Right Updated: 04/16/24 2104     Protime 13.4 seconds      INR 0.96    APTT [[id number]]  (Normal) Collected: 04/16/24 2046    Lab Status: Final result Specimen: Blood from Arm, Right Updated: 04/16/24 2104     PTT 31 seconds     CBC and differential [[id number]] Collected: 04/16/24 2046    Lab Status: Final result Specimen: Blood from Arm, Right Updated: 04/16/24 2052     WBC 7.86 Thousand/uL      RBC 4.45 Million/uL      Hemoglobin 13.3 g/dL      Hematocrit 38.6 %      MCV 87 fL      MCH 29.9 pg      MCHC 34.5 g/dL      RDW 12.1 %      MPV 9.1 fL      Platelets 250 Thousands/uL      nRBC 0 /100 WBCs      Segmented % 51 %      Immature Grans % 0 %      Lymphocytes % 37 %      Monocytes % 8 %      Eosinophils Relative 4 %      Basophils Relative 0 %      Absolute Neutrophils 3.99 Thousands/µL      Absolute Immature Grans 0.01 Thousand/uL      Absolute Lymphocytes 2.88 Thousands/µL      Absolute Monocytes 0.66  Thousand/µL      Eosinophils Absolute 0.29 Thousand/µL      Basophils Absolute 0.03 Thousands/µL                    CTA ED chest PE study   ED Interpretation by Kole Ruiz PA-C (04/16 2321)   Susan Pabon MD  [phone] 4/16/2024     Narrative & Impression  CTA - CHEST WITH IV CONTRAST - PULMONARY ANGIOGRAM     INDICATION: left pleuritic chest pain; hx of bladder mass.     COMPARISON: CT abdomen and pelvis on 1/31/2024.     TECHNIQUE: CTA examination of the chest was performed using angiographic technique according to a protocol specifically tailored to evaluate for pulmonary embolism. Multiplanar 2D reformatted images were created from the source data. In addition, coronal   3D MIP postprocessing was performed on the acquisition scanner.     Radiation dose length product (DLP) for this visit: 388 mGy-cm . This examination, like all CT scans performed in the [facility] beds at the [facility], was performed utilizing techniques to minimize radiation dose exposure, including the use of iterative   reconstruction and automated exposure control.     IV Contrast: 85 mL of iohexol (OMNIPAQUE)     FINDINGS:     PULMONARY ARTERIAL TREE: No pulmonary embolus is seen.     LUNGS: M   inimal bibasilar atelectasis. No focal consolidation. There is no tracheal or endobronchial lesion.     PLEURA: Unremarkable.     HEART/GREAT VESSELS: Heart is unremarkable for patient's age. No thoracic aortic aneurysm.     MEDIASTINUM AND RENÉE: Unremarkable.     CHEST WALL AND LOWER NECK: Unremarkable.     VISUALIZED STRUCTURES IN THE UPPER ABDOMEN: Redemonstration of symmetrically calcified bilateral adrenal glands.     OSSEOUS STRUCTURES: No acute fracture or destructive osseous lesion.     IMPRESSION:     No evidence of pulmonary embolus.                 Workstation performed: [id number]           Specimen Collected: 04/16/24 22:56 Last Resulted: 04/16/24 23:13                 Final Result by Susan Pabon MD (04/16 8664)      No  evidence of pulmonary embolus.                  Workstation performed: [id number]                    Procedures  ECG 12 Lead Documentation Only    Date/Time: 4/16/2024 8:41 PM    Performed by: Kole Ruiz PA-C  Authorized by: Kole Ruiz PA-C    Indications / Diagnosis:  Left sided chest pain  ECG reviewed by me, the ED Provider: yes    Patient location:  ED  Previous ECG:     Previous ECG:  Compared to current    Comparison ECG info:  When compared with ECG of January 24, 2021, no significant changes were noted.    Similarity:  No change    Comparison to cardiac monitor: Yes    Interpretation:     Interpretation: normal    Quality:     Tracing quality:  Limited by artifact  Rate:     ECG rate:  59    ECG rate assessment: bradycardic    Rhythm:     Rhythm: sinus bradycardia    Ectopy:     Ectopy: none    QRS:     QRS axis:  Normal    QRS intervals:  Normal  Conduction:     Conduction: normal    ST segments:     ST segments:  Normal  T waves:     T waves: normal             ED Course             HEART Risk Score      Flowsheet Row Most Recent Value   Heart Score Risk Calculator    History 0 Filed at: 04/16/2024 2330   ECG 0 Filed at: 04/16/2024 2330   Age 0 Filed at: 04/16/2024 2330   Risk Factors 1 Filed at: 04/16/2024 2330   Troponin 0 Filed at: 04/16/2024 2330   HEART Score 1 Filed at: 04/16/2024 2330                  PERC Rule for PE      Flowsheet Row Most Recent Value   PERC Rule for PE    Age >=50 0 Filed at: 04/16/2024 2042   HR >=100 0 Filed at: 04/16/2024 2042   O2 Sat on room air < 95% 0 Filed at: 04/16/2024 2042   History of PE or DVT 0 Filed at: 04/16/2024 2042   Recent trauma or surgery 1 Filed at: 04/16/2024 2042   Hemoptysis 0 Filed at: 04/16/2024 2042   Exogenous estrogen 0 Filed at: 04/16/2024 2042   Unilateral leg swelling 0 Filed at: 04/16/2024 2042   PERC Rule for PE Results 1 Filed at: 04/16/2024 2042                    Wells' Criteria for PE      Flowsheet Row Most Recent Value   Michael'  "Criteria for PE    Clinical signs and symptoms of DVT 0 Filed at: 04/16/2024 2041   PE is primary diagnosis or equally likely 3 Filed at: 04/16/2024 2041   HR >100 0 Filed at: 04/16/2024 2041   Immobilization at least 3 days or Surgery in the previous 4 weeks 0 Filed at: 04/16/2024 2041   Previous, objectively diagnosed PE or DVT 0 Filed at: 04/16/2024 2041   Hemoptysis 0 Filed at: 04/16/2024 2041   Malignancy with treatment within 6 months or palliative 0 Filed at: 04/16/2024 2041   Wells' Criteria Total 3 Filed at: 04/16/2024 2041                  Medical Decision Making  Patient is a 33-year-old male with a history of hyperlipidemia, status past removal of bladder mass approximately 2 weeks ago that presents to the ED with sharp shooting nonradiating left-sided chest pain and shortness of breath symptoms for about 2 weeks.    Patient hemodynamically stable and afebrile  No sirs  ECG with sinus bradycardia with no ischemic changes; negative troponin, heart score of 1, doubt cardiac involvement  Patient with [facility] visit ED, recent surgery, CTA chest PE study-impression-\"no evidence of pulmonary embolus.\"  No leukocytosis, no bandemia  Normal electrolytes, normal kidney function  Negative lipase, doubt acute pancreatitis    Ddx likely and not limited to ACS, pulmonary embolism, pneumothorax, rib fracture, pneumonia, pleurisy,  Very likely musculoskeletal left-sided chest wall pain symptomatology, less likely GERD.  Patient has no neck pain, no cervical radiculopathy noted, upper extremities 5 out of 5 strength bilaterally, neurovascularly intact  Counseled patient on conservative/supportive management at this time, may take over-the-counter Motrin 400 mg every 6 hours as needed for left-sided chest wall pain symptoms not to exceed 3600 mg daily.  Follow-up with PCP  Follow up with emergency department if symptoms persist or exacerbate  Patient demonstrates verbal understanding of all clinical laboratory and imaging " findings, discharge instructions, follow-up, and verbally agrees with current treatment plan with teach back    *Due to voice recognition software, sound alike and misspelled words may be contained in the documentation*      Amount and/or Complexity of Data Reviewed  Labs: ordered. Decision-making details documented in ED Course.  Radiology: ordered and independent interpretation performed. Decision-making details documented in ED Course.  ECG/medicine tests: ordered and independent interpretation performed. Decision-making details documented in ED Course.    Risk  Prescription drug management.             Disposition  Final diagnoses:   Left-sided chest wall pain     Time reflects when diagnosis was documented in both MDM as applicable and the Disposition within this note       Time User Action Codes Description Comment    4/16/2024 11:21 PM Kole Ruiz Add [R07.89] Left-sided chest wall pain           ED Disposition       ED Disposition   Discharge    Condition   Stable    Date/Time   Tue Apr 16, 2024 2322    Comment   Bryan Lee discharge to home/self care.                   Follow-up Information       Follow up With Specialties Details Why Contact Info Additional Information    [facility], Nurse Practitioner   [address] [address]  [phone]       [facility] Emergency Medicine   [address] [facility] [address]  [phone] [facility], [address] [place], [place], [address]            Discharge Medication List as of 4/16/2024 11:23 PM        CONTINUE these medications which have NOT CHANGED    Details   atorvastatin (LIPITOR) 20 mg tablet Take 1 tablet (20 mg total) by mouth daily with dinner, Starting Tue 4/9/2024, Normal      halobetasol (ULTRAVATE) 0.05 % cream once, Starting Fri 2/16/2024, Historical Med             No discharge  procedures on file.    PDMP Review         Value Time User    PDMP Reviewed  Yes 4/16/2024 11:23 PM Kole Ruiz PA-C            ED Provider  Electronically Signed by             Kole Ruiz PA-C  04/17/24 0050

## 2024-04-18 LAB
ATRIAL RATE: 59 BPM
P AXIS: 51 DEGREES
PR INTERVAL: 134 MS
QRS AXIS: 47 DEGREES
QRSD INTERVAL: 96 MS
QT INTERVAL: 386 MS
QTC INTERVAL: 382 MS
T WAVE AXIS: 35 DEGREES
VENTRICULAR RATE: 59 BPM

## 2024-04-18 PROCEDURE — 93010 ELECTROCARDIOGRAM REPORT: CPT | Performed by: INTERNAL MEDICINE

## 2024-04-28 ENCOUNTER — OFFICE VISIT (OUTPATIENT)
Dept: URGENT CARE | Facility: CLINIC | Age: 34
End: 2024-04-28
Payer: COMMERCIAL

## 2024-04-28 VITALS
SYSTOLIC BLOOD PRESSURE: 151 MMHG | HEART RATE: 62 BPM | WEIGHT: 170 LBS | BODY MASS INDEX: 25.76 KG/M2 | RESPIRATION RATE: 14 BRPM | TEMPERATURE: 97.6 F | HEIGHT: 68 IN | DIASTOLIC BLOOD PRESSURE: 92 MMHG | OXYGEN SATURATION: 99 %

## 2024-04-28 DIAGNOSIS — K04.7 DENTAL INFECTION: Primary | ICD-10-CM

## 2024-04-28 PROCEDURE — 99214 OFFICE O/P EST MOD 30 MIN: CPT | Performed by: PHYSICIAN ASSISTANT

## 2024-04-28 RX ORDER — AMOXICILLIN 875 MG/1
875 TABLET, COATED ORAL 2 TIMES DAILY
Qty: 14 TABLET | Refills: 0 | Status: SHIPPED | OUTPATIENT
Start: 2024-04-28 | End: 2024-05-05

## 2024-04-28 NOTE — PATIENT INSTRUCTIONS
Take amoxicillin as prescribed.  Recommend salt water gargles 2-3 times daily for 5 to 10 minutes.  Dissolve approximately 1 teaspoon of salt in 8 ounces of water.  Continue Listerine mouthwash as needed.  Continue Motrin and Tylenol as needed for pain.  Follow-up with dentist or oral surgeon tomorrow and let them know about your visit today and ensure they do not want to postpone your extraction and bone grafting.  If symptoms worsen or new symptoms develop such as fevers or chills report to the emergency room immediately for further evaluation.

## 2024-04-28 NOTE — PROGRESS NOTES
[facility]        NAME: Bryan Lee is a 33 y.o. male  : 1990    MRN: [id number]  DATE: 2024  TIME: 9:56 AM    Assessment and Plan   Dental infection [K04.7]  1. Dental infection  amoxicillin (AMOXIL) 875 mg tablet      Patient presents with dental abscess and infection recommend amoxicillin to treat.  He will follow-up with his dentist first thing in the morning.      Patient Instructions     Patient Instructions   Take amoxicillin as prescribed.  Recommend salt water gargles 2-3 times daily for 5 to 10 minutes.  Dissolve approximately 1 teaspoon of salt in 8 ounces of water.  Continue Listerine mouthwash as needed.  Continue Motrin and Tylenol as needed for pain.  Follow-up with dentist or oral surgeon tomorrow and let them know about your visit today and ensure they do not want to postpone your extraction and bone grafting.  If symptoms worsen or new symptoms develop such as fevers or chills report to the emergency room immediately for further evaluation.    Follow up with PCP in 3-5 days.  Proceed to  ER if symptoms worsen.    Chief Complaint     Chief Complaint   Patient presents with    Dental Pain     Left sided lower dental pain.          History of Present Illness       33-year-old male presents with complaints of left lower dental pain.  Patient reports that he has a broken tooth and is scheduled to have an extraction with bone grafting on Friday.  He notes that he was at his dentist earlier this week as a result of symptoms and that they diagnosed him with an abscess but felt like it had drained on its own and did not place him on any antibiotics at that time.  He feels like he is having increased pain and is continue to have drainage.  Nuys fever and chills.    Dental Pain   Pertinent negatives include no fever.       Review of Systems   Review of Systems   Constitutional:  Negative for chills and fever.   HENT:  Positive for dental problem.          Current Medications  "      Current Outpatient Medications:     amoxicillin (AMOXIL) 875 mg tablet, Take 1 tablet (875 mg total) by mouth 2 (two) times a day for 7 days, Disp: 14 tablet, Rfl: 0    atorvastatin (LIPITOR) 20 mg tablet, Take 1 tablet (20 mg total) by mouth daily with dinner, Disp: 90 tablet, Rfl: 1    halobetasol (ULTRAVATE) 0.05 % cream, once, Disp: , Rfl:     Current Allergies     Allergies as of 04/28/2024    (No Known Allergies)            The following portions of the patient's history were reviewed and updated as appropriate: allergies, current medications, past family history, past medical history, past social history, past surgical history and problem list.     Past Medical History:   Diagnosis Date    Drug abuse (HCC) 01/25/2021    Hyperlipidemia        Past Surgical History:   Procedure Laterality Date    BURN DEBRIDEMENT SURGERY      WRIST SURGERY         Family History   Problem Relation Age of Onset    Diabetes Mother     Perez's disease Mother     Diabetes Father     Heart disease Father         bypass; valves - 40s         Medications have been verified.        Objective   /92   Pulse 62   Temp 97.6 °F (36.4 °C)   Resp 14   Ht 5' 8\" (1.727 m)   Wt 77.1 kg (170 lb)   SpO2 99%   BMI 25.85 kg/m²   No LMP for male patient.       Physical Exam     Physical Exam  Vitals and nursing note reviewed.   Constitutional:       General: He is awake. He is not in acute distress.     Appearance: Normal appearance. He is well-developed and well-groomed. He is not ill-appearing, toxic-appearing or diaphoretic.   HENT:      Head: Normocephalic and atraumatic.      Right Ear: Hearing and external ear normal.      Left Ear: Hearing and external ear normal.      Mouth/Throat:      Lips: Pink. No lesions.      Mouth: Mucous membranes are moist.      Dentition: Abnormal dentition. Does not have dentures. Dental tenderness, gingival swelling and dental abscesses present. No dental caries or gum lesions.      Tongue: " "No lesions. Tongue does not deviate from midline.      Palate: No mass and lesions.      Pharynx: Oropharynx is clear. Uvula midline.        Comments: Left lower first molar is fractured.  There is a small abscess at the base of this tooth and there appears to be purulent material draining from the tooth itself.  He has mild swelling along the jaw overlying the abscess with no erythema.  There is no bony tenderness of the lower mandible.  Eyes:      General: Lids are normal. Vision grossly intact. Gaze aligned appropriately.   Cardiovascular:      Rate and Rhythm: Normal rate.   Pulmonary:      Effort: Pulmonary effort is normal.      Comments: Patient is speaking in full sentences with no increased respiratory effort. No audible wheezing or stridor.   Musculoskeletal:      Cervical back: Normal range of motion.   Skin:     General: Skin is warm and dry.   Neurological:      Mental Status: He is alert and oriented to person, place, and time.      Coordination: Coordination is intact.      Gait: Gait is intact.   Psychiatric:         Attention and Perception: Attention and perception normal.         Mood and Affect: Mood and affect normal.         Speech: Speech normal.         Behavior: Behavior normal. Behavior is cooperative.               Note: Portions of this record may have been created with voice recognition software. Occasional wrong word or \"sound a like\" substitutions may have occurred due to the inherent limitations of voice recognition software. Please read the chart carefully and recognize, using context, where substitutions have occurred.*      "

## 2024-06-21 ENCOUNTER — OFFICE VISIT (OUTPATIENT)
Dept: URGENT CARE | Facility: CLINIC | Age: 34
End: 2024-06-21
Payer: COMMERCIAL

## 2024-06-21 VITALS
TEMPERATURE: 97.5 F | OXYGEN SATURATION: 99 % | SYSTOLIC BLOOD PRESSURE: 134 MMHG | HEART RATE: 76 BPM | HEIGHT: 68 IN | RESPIRATION RATE: 16 BRPM | WEIGHT: 170 LBS | DIASTOLIC BLOOD PRESSURE: 88 MMHG | BODY MASS INDEX: 25.76 KG/M2

## 2024-06-21 DIAGNOSIS — R59.9 ADENOPATHY: Primary | ICD-10-CM

## 2024-06-21 PROCEDURE — G0382 LEV 3 HOSP TYPE B ED VISIT: HCPCS | Performed by: NURSE PRACTITIONER

## 2024-06-21 PROCEDURE — S9083 URGENT CARE CENTER GLOBAL: HCPCS | Performed by: NURSE PRACTITIONER

## 2024-06-21 RX ORDER — APREMILAST 10-20-30MG
KIT ORAL
COMMUNITY
Start: 2024-06-06

## 2024-06-21 NOTE — PROGRESS NOTES
[facility]        NAME: Bryan Lee is a 33 y.o. male  : 1990    MRN: [id number]  DATE: 2024  TIME: 3:04 PM    Assessment and Plan   Adenopathy [R59.9]  1. Adenopathy          Advised to monitor node for 1-2 more weeks as the increase in size should resolve during this time. However, if the node grow or does not resolve follow up with your PCP for further evaluation.     Patient Instructions       Follow up with PCP in 3-5 days.  Proceed to  ER if symptoms worsen.    Chief Complaint     Chief Complaint   Patient presents with    Dental Pain     Left lower dental pain with neck gland swelling.          History of Present Illness       Patient is a 33 year old male presenting for evaluation of enlarged lymph node. He states he noticed it about 2-3 weeks ago. Around this time he had a dental infection on the same side as the node. The tooth was pulled and he had a deep cleaning done of his teeth. He states the node fluctuates in size. He feels that when he works out it gets larger. Denies recent fever or chills. Denies current illness. No oral swelling or dental pain.     Dental Pain   Pertinent negatives include no fever.       Review of Systems   Review of Systems   Constitutional:  Negative for activity change, chills and fever.   HENT:  Negative for dental problem, facial swelling, mouth sores and postnasal drip.    Hematological:  Positive for adenopathy.         Current Medications       Current Outpatient Medications:     Otezla 10 & 20 & 30 MG TBPK, , Disp: , Rfl:     atorvastatin (LIPITOR) 20 mg tablet, Take 1 tablet (20 mg total) by mouth daily with dinner, Disp: 90 tablet, Rfl: 1    halobetasol (ULTRAVATE) 0.05 % cream, once, Disp: , Rfl:     Current Allergies     Allergies as of 2024    (No Known Allergies)            The following portions of the patient's history were reviewed and updated as appropriate: allergies, current medications, past family history, past  "medical history, past social history, past surgical history and problem list.     Past Medical History:   Diagnosis Date    Drug abuse (HCC) 01/25/2021    Hyperlipidemia        Past Surgical History:   Procedure Laterality Date    BURN DEBRIDEMENT SURGERY      WRIST SURGERY         Family History   Problem Relation Age of Onset    Diabetes Mother     Arnold's disease Mother     Diabetes Father     Heart disease Father         bypass; valves - 40s         Medications have been verified.        Objective   /88   Pulse 76   Temp 97.5 °F (36.4 °C)   Resp 16   Ht 5' 8\" (1.727 m)   Wt 77.1 kg (170 lb)   SpO2 99%   BMI 25.85 kg/m²        Physical Exam     Physical Exam  Vitals reviewed.   Constitutional:       General: He is awake. He is not in acute distress.     Appearance: Normal appearance. He is normal weight.   HENT:      Head: Normocephalic.      Right Ear: Hearing, tympanic membrane, ear canal and external ear normal.      Left Ear: Hearing, tympanic membrane, ear canal and external ear normal.      Nose: Nose normal.      Mouth/Throat:      Lips: Pink.      Pharynx: Oropharynx is clear.     Cardiovascular:      Rate and Rhythm: Normal rate.   Pulmonary:      Effort: Pulmonary effort is normal.   Lymphadenopathy:      Cervical: Cervical adenopathy present.      Left cervical: Superficial cervical adenopathy present.      Comments: Small, non tender palpable mobile node on the left anterior cevical    Neurological:      General: No focal deficit present.      Mental Status: He is alert and oriented to person, place, and time.   Psychiatric:         Behavior: Behavior is cooperative.                   "

## 2024-07-11 ENCOUNTER — APPOINTMENT (OUTPATIENT)
Dept: LAB | Facility: CLINIC | Age: 34
End: 2024-07-11
Payer: COMMERCIAL

## 2024-07-11 DIAGNOSIS — E78.2 MIXED HYPERLIPIDEMIA: ICD-10-CM

## 2024-07-11 DIAGNOSIS — Z13.6 SCREENING FOR ISCHEMIC HEART DISEASE: Primary | ICD-10-CM

## 2024-07-11 LAB
ALBUMIN SERPL BCG-MCNC: 4.4 G/DL (ref 3.5–5)
ALP SERPL-CCNC: 63 U/L (ref 34–104)
ALT SERPL W P-5'-P-CCNC: 24 U/L (ref 7–52)
ANION GAP SERPL CALCULATED.3IONS-SCNC: 10 MMOL/L (ref 4–13)
AST SERPL W P-5'-P-CCNC: 21 U/L (ref 13–39)
BILIRUB SERPL-MCNC: 0.67 MG/DL (ref 0.2–1)
BUN SERPL-MCNC: 15 MG/DL (ref 5–25)
CALCIUM SERPL-MCNC: 9.6 MG/DL (ref 8.4–10.2)
CHLORIDE SERPL-SCNC: 102 MMOL/L (ref 96–108)
CHOLEST SERPL-MCNC: 134 MG/DL
CO2 SERPL-SCNC: 27 MMOL/L (ref 21–32)
CREAT SERPL-MCNC: 0.82 MG/DL (ref 0.6–1.3)
GFR SERPL CREATININE-BSD FRML MDRD: 114 ML/MIN/1.73SQ M
GLUCOSE P FAST SERPL-MCNC: 72 MG/DL (ref 65–99)
HDLC SERPL-MCNC: 46 MG/DL
LDLC SERPL CALC-MCNC: 62 MG/DL (ref 0–100)
NONHDLC SERPL-MCNC: 88 MG/DL
POTASSIUM SERPL-SCNC: 4.4 MMOL/L (ref 3.5–5.3)
PROT SERPL-MCNC: 7.4 G/DL (ref 6.4–8.4)
SODIUM SERPL-SCNC: 139 MMOL/L (ref 135–147)
TRIGL SERPL-MCNC: 132 MG/DL

## 2024-07-11 PROCEDURE — 80061 LIPID PANEL: CPT

## 2024-07-11 PROCEDURE — 80053 COMPREHEN METABOLIC PANEL: CPT

## 2024-07-11 PROCEDURE — 36415 COLL VENOUS BLD VENIPUNCTURE: CPT

## 2024-07-17 ENCOUNTER — RA CDI HCC (OUTPATIENT)
Dept: OTHER | Facility: HOSPITAL | Age: 34
End: 2024-07-17

## 2024-07-17 NOTE — PROGRESS NOTES
HCC coding opportunities       Chart reviewed, no opportunity found: CHART REVIEWED, NO OPPORTUNITY FOUND        Patients Insurance        Commercial Insurance: [employer]

## 2024-07-24 ENCOUNTER — OFFICE VISIT (OUTPATIENT)
Dept: FAMILY MEDICINE CLINIC | Facility: CLINIC | Age: 34
End: 2024-07-24
Payer: COMMERCIAL

## 2024-07-24 VITALS
RESPIRATION RATE: 18 BRPM | TEMPERATURE: 97 F | HEART RATE: 73 BPM | OXYGEN SATURATION: 97 % | DIASTOLIC BLOOD PRESSURE: 90 MMHG | WEIGHT: 177 LBS | SYSTOLIC BLOOD PRESSURE: 140 MMHG | HEIGHT: 69 IN | BODY MASS INDEX: 26.22 KG/M2

## 2024-07-24 DIAGNOSIS — Z23 ENCOUNTER FOR IMMUNIZATION: ICD-10-CM

## 2024-07-24 DIAGNOSIS — K92.1 MELENA: ICD-10-CM

## 2024-07-24 DIAGNOSIS — E78.2 MIXED HYPERLIPIDEMIA: ICD-10-CM

## 2024-07-24 DIAGNOSIS — Z13.6 SCREENING FOR CARDIOVASCULAR CONDITION: ICD-10-CM

## 2024-07-24 DIAGNOSIS — Z23 NEED FOR VACCINATION: ICD-10-CM

## 2024-07-24 DIAGNOSIS — C67.9 MALIGNANT NEOPLASM OF URINARY BLADDER, UNSPECIFIED SITE (HCC): ICD-10-CM

## 2024-07-24 DIAGNOSIS — Z00.00 ROUTINE ADULT HEALTH MAINTENANCE: Primary | ICD-10-CM

## 2024-07-24 PROCEDURE — 99395 PREV VISIT EST AGE 18-39: CPT | Performed by: NURSE PRACTITIONER

## 2024-07-24 PROCEDURE — 90715 TDAP VACCINE 7 YRS/> IM: CPT

## 2024-07-24 PROCEDURE — 90471 IMMUNIZATION ADMIN: CPT

## 2024-07-24 NOTE — PROGRESS NOTES
Adult Annual Physical  Name: Bryan Lee      : 1990      MRN: [id number]  Encounter Provider: SUJIT Conn  Encounter Date: 2024   Encounter department: [facility]    Assessment & Plan   1. Routine adult health maintenance  2. Malignant neoplasm of urinary bladder, unspecified site (HCC)  3. Melena  -     Occult blood 1-3, stool; Future  4. Need for vaccination  5. Encounter for immunization  -     TDAP VACCINE GREATER THAN OR EQUAL TO 8YO IM  6. Screening for cardiovascular condition  -     Comprehensive metabolic panel; Future; Expected date: 2025  7. Mixed hyperlipidemia  -     Lipid panel; Future; Expected date: 2025  Immunizations and preventive care screenings were discussed with patient today. Appropriate education was printed on patient's after visit summary.    Counseling:  Dental Health: discussed importance of regular tooth brushing, flossing, and dental visits.  Exercise: the importance of regular exercise/physical activity was discussed. Recommend exercise 3-5 times per week for at least 30 minutes.          History of Present Illness     Adult Annual Physical:  Patient presents for annual physical. Here today for CPE.  Bladder tumor came back as malignant.  Status post resection.  He underwent repeat cystoscopy and will be going in for chemo instillation in September.  Reports he had a tooth pulled on the left and had a lymph node in the left side of his neck that was occasionally tender and enlarged.  He still notices this.  Also with concerns for possible blood in the stool.  Reports this is mixed in, difficult to tell if it is food particle or related to vitamins.    Urology- [facility]     Diet and Physical Activity:  - Diet/Nutrition: well balanced diet.  - Exercise: moderate cardiovascular exercise.    Depression Screening:  - PHQ-2 Score: 0    General Health:  - Sleep: sleeps well.  - Hearing: normal hearing left ear and normal hearing  "right ear.  - Vision: no vision problems.  - Dental: regular dental visits.    Review of Systems   Constitutional: Negative.    Respiratory: Negative.     Cardiovascular: Negative.    Gastrointestinal:  Positive for blood in stool.   Genitourinary:         See HPI     Current Outpatient Medications on File Prior to Visit   Medication Sig Dispense Refill   • atorvastatin (LIPITOR) 20 mg tablet Take 1 tablet (20 mg total) by mouth daily with dinner 90 tablet 1   • Otezla 10 & 20 & 30 MG TBPK      • halobetasol (ULTRAVATE) 0.05 % cream once (Patient not taking: Reported on 7/24/2024)       No current facility-administered medications on file prior to visit.      Social History     Tobacco Use   • Smoking status: Former     Current packs/day: 1.50     Average packs/day: 1.5 packs/day for 0.8 years (1.2 ttl pk-yrs)     Types: Cigarettes     Start date: 10/1/2023     Quit date: 2002     Passive exposure: Never   • Smokeless tobacco: Never   Vaping Use   • Vaping status: Some Days   • Start date: 10/2/2023   • Substances: Nicotine, Flavoring   Substance and Sexual Activity   • Alcohol use: Not Currently     Comment: not since oct   • Drug use: Not Currently     Types: Marijuana, Cocaine   • Sexual activity: Not on file       Objective     /90   Pulse 73   Temp (!) 97 °F (36.1 °C)   Resp 18   Ht 5' 8.5\" (1.74 m)   Wt 80.3 kg (177 lb)   SpO2 97%   BMI 26.52 kg/m²     Physical Exam  Vitals and nursing note reviewed.   Constitutional:       General: He is not in acute distress.     Appearance: Normal appearance. He is well-developed.   HENT:      Head: Normocephalic and atraumatic.      Right Ear: Tympanic membrane and external ear normal.      Left Ear: Tympanic membrane and external ear normal.      Nose: Nose normal.      Mouth/Throat:      Pharynx: Oropharynx is clear.   Eyes:      Extraocular Movements: Extraocular movements intact.      Conjunctiva/sclera: Conjunctivae normal.      Pupils: Pupils are equal, " round, and reactive to light.   Neck:      Vascular: No carotid bruit.   Cardiovascular:      Rate and Rhythm: Normal rate and regular rhythm.      Pulses: Normal pulses.      Heart sounds: Normal heart sounds. No murmur heard.  Pulmonary:      Effort: Pulmonary effort is normal. No respiratory distress.      Breath sounds: Normal breath sounds.   Abdominal:      General: Bowel sounds are normal.      Palpations: Abdomen is soft.      Tenderness: There is no abdominal tenderness.      Hernia: No hernia is present.   Musculoskeletal:         General: No swelling or deformity. Normal range of motion.      Cervical back: Normal range of motion and neck supple.   Skin:     General: Skin is warm and dry.      Capillary Refill: Capillary refill takes less than 2 seconds.   Neurological:      Mental Status: He is alert and oriented to person, place, and time.      Sensory: No sensory deficit.      Coordination: Coordination normal.      Deep Tendon Reflexes: Reflexes normal.   Psychiatric:         Mood and Affect: Mood normal.         Behavior: Behavior normal.

## 2024-07-25 NOTE — ED PROVIDER NOTES
History  Chief Complaint   Patient presents with   • Dental Problem     Pt reports dental abscess starting a week and half ago. Pt states in the past four days the abscess started to increase in size and pain with left sided facial swelling. Pt states he saw dentist who referred him to ER with no medications prescribed. Pt states he has been cephalexin 500 mg from previous infection. 28 yo male c/o worsening left lower toothache that started about 4 days ago. Today got swelling left lower jaw. Seen by dentist and told to come to ER for meds. He has been taking left over Keflex for a few days. No fever, cough, vomiting. History provided by:  Patient   used: No        Prior to Admission Medications   Prescriptions Last Dose Informant Patient Reported? Taking?   atorvastatin (LIPITOR) 20 mg tablet   No No   Sig: Take 1 tablet (20 mg total) by mouth daily with dinner   Patient not taking: Reported on 6/10/2022   fenofibrate (TRICOR) 145 mg tablet   No No   Sig: Take 1 tablet (145 mg total) by mouth daily   Patient not taking: Reported on 6/10/2022   nicotine (NICODERM CQ) 14 mg/24hr TD 24 hr patch   No No   Sig: Place 1 patch on the skin daily   Patient not taking: Reported on 6/10/2022      Facility-Administered Medications: None       Past Medical History:   Diagnosis Date   • Hyperlipidemia        Past Surgical History:   Procedure Laterality Date   • BURN DEBRIDEMENT SURGERY     • WRIST SURGERY         Family History   Problem Relation Age of Onset   • Diabetes Mother    • Heart disease Father         bypass; valves - 45s     I have reviewed and agree with the history as documented.     E-Cigarette/Vaping   • E-Cigarette Use Current Every Day User      E-Cigarette/Vaping Substances   • Nicotine No    • THC Yes    • CBD No    • Flavoring No    • Other No    • Unknown No      Social History     Tobacco Use   • Smoking status: Every Day     Packs/day: 1.00     Types: Cigarettes   • Smokeless tobacco: Never   Vaping Use   • Vaping Use: Every day   • Substances: THC   Substance Use Topics   • Alcohol use: Yes     Comment: daily   • Drug use: Yes     Types: Marijuana       Review of Systems   Constitutional: Negative for fever. HENT: Positive for dental problem. Respiratory: Negative for cough. Gastrointestinal: Negative for diarrhea and vomiting. Physical Exam  Physical Exam  Vitals and nursing note reviewed. Constitutional:       General: He is not in acute distress. Appearance: He is not ill-appearing. HENT:      Head: Normocephalic and atraumatic. Right Ear: Tympanic membrane normal.      Left Ear: Tympanic membrane and ear canal normal.      Mouth/Throat:      Mouth: Mucous membranes are moist.      Comments: + decay left lower molar with mild sts over jaw  Eyes:      General: No scleral icterus. Conjunctiva/sclera: Conjunctivae normal.   Cardiovascular:      Rate and Rhythm: Normal rate and regular rhythm. Heart sounds: Normal heart sounds. Pulmonary:      Effort: Pulmonary effort is normal. No respiratory distress. Breath sounds: Normal breath sounds. Musculoskeletal:         General: No deformity. Cervical back: Normal range of motion and neck supple. Lymphadenopathy:      Cervical: No cervical adenopathy. Skin:     General: Skin is warm and dry. Neurological:      General: No focal deficit present. Mental Status: He is alert and oriented to person, place, and time.    Psychiatric:         Mood and Affect: Mood normal.         Behavior: Behavior normal.         Vital Signs  ED Triage Vitals [08/09/23 1546]   Temperature Pulse Respirations Blood Pressure SpO2   98.8 °F (37.1 °C) 70 20 138/91 100 %      Temp Source Heart Rate Source Patient Position - Orthostatic VS BP Location FiO2 (%)   Oral Monitor Sitting Right arm --      Pain Score       No Pain           Vitals:    08/09/23 1546   BP: 138/91   Pulse: 70   Patient Position - Orthostatic VS: Sitting         Visual Acuity      ED Medications  Medications   amoxicillin-clavulanate (AUGMENTIN) 875-125 mg per tablet 1 tablet (1 tablet Oral Given 8/9/23 1655)   traMADol (ULTRAM) tablet 50 mg (50 mg Oral Given 8/9/23 1655)       Diagnostic Studies  Results Reviewed     None                 No orders to display              Procedures  Procedures         ED Course                               SBIRT 20yo+    Flowsheet Row Most Recent Value   Initial Alcohol Screen: US AUDIT-C     1. How often do you have a drink containing alcohol? 0 Filed at: 08/09/2023 1551   2. How many drinks containing alcohol do you have on a typical day you are drinking? 0 Filed at: 08/09/2023 1551   3a. Male UNDER 65: How often do you have five or more drinks on one occasion? 0 Filed at: 08/09/2023 1551   3b. FEMALE Any Age, or MALE 65+: How often do you have 4 or more drinks on one occassion? 0 Filed at: 08/09/2023 1551   Audit-C Score 0 Filed at: 08/09/2023 1551   SILKE: How many times in the past year have you. .. Used an illegal drug or used a prescription medication for non-medical reasons? Never Filed at: 08/09/2023 1551                    Medical Decision Making  Advised augmentin, warm salt water rinses, ultram prn pain, follow up outpt. Dentist/oral surgeon. Risk  Prescription drug management. Disposition  Final diagnoses:   Dental abscess     Time reflects when diagnosis was documented in both MDM as applicable and the Disposition within this note     Time User Action Codes Description Comment    6/3/3981  4:50 PM Sabrina Longoria Add [L34.3] Dental abscess       ED Disposition     ED Disposition   Discharge    Condition   Stable    Date/Time   Wed Aug 9, 2023  4:16 PM    [address] discharge to home/self care.                Follow-up Information    None         Discharge Medication List as of 8/9/2023  4:18 PM      START taking these medications    Details amoxicillin-clavulanate (AUGMENTIN) 875-125 mg per tablet Take 1 tablet by mouth every 12 (twelve) hours for 10 days, Starting Wed 8/9/2023, Until Sat 8/19/2023, Normal      traMADol (ULTRAM) 50 mg tablet 1-2 po q 6 hours prn pain, Normal         CONTINUE these medications which have NOT CHANGED    Details   atorvastatin (LIPITOR) 20 mg tablet Take 1 tablet (20 mg total) by mouth daily with dinner, Starting Mon 1/25/2021, Normal      fenofibrate (TRICOR) 145 mg tablet Take 1 tablet (145 mg total) by mouth daily, Starting Mon 1/25/2021, Normal      nicotine (NICODERM CQ) 14 mg/24hr TD 24 hr patch Place 1 patch on the skin daily, Starting Tue 1/26/2021, Normal             No discharge procedures on file.     PDMP Review     None          ED Provider  Electronically Signed by           Esteban Sherman MD  24/85/72 [id number] Oz[address] Irene (Fellow)

## 2024-08-07 ENCOUNTER — CONSULT (OUTPATIENT)
Dept: FAMILY MEDICINE CLINIC | Facility: CLINIC | Age: 34
End: 2024-08-07
Payer: COMMERCIAL

## 2024-08-07 VITALS
BODY MASS INDEX: 26.36 KG/M2 | HEART RATE: 78 BPM | OXYGEN SATURATION: 98 % | TEMPERATURE: 97.2 F | HEIGHT: 69 IN | RESPIRATION RATE: 18 BRPM | SYSTOLIC BLOOD PRESSURE: 136 MMHG | DIASTOLIC BLOOD PRESSURE: 86 MMHG | WEIGHT: 178 LBS

## 2024-08-07 DIAGNOSIS — C67.9 MALIGNANT NEOPLASM OF URINARY BLADDER, UNSPECIFIED SITE (HCC): Primary | ICD-10-CM

## 2024-08-07 PROBLEM — R07.9 CHEST PAIN: Status: RESOLVED | Noted: 2021-01-23 | Resolved: 2024-08-07

## 2024-08-07 PROCEDURE — 99214 OFFICE O/P EST MOD 30 MIN: CPT | Performed by: NURSE PRACTITIONER

## 2024-08-07 NOTE — PROGRESS NOTES
[facility] PRE-OPERATIVE EVALUATION  [facility]    NAME: Bryan Lee  AGE: 33 y.o. SEX: male  : 1990     DATE: 2024    [facility] Pre-Operative Evaluation      Chief Complaint: Pre-operative Evaluation     Surgery: 24  Anticipated Date of Surgery: 24  Surgeon: Dr. Molina      History of Present Illness:     Here today for follow-up prior to upcoming surgery.          Anesthesia:   choice  Bleeding Risk: no recent abnormal bleeding, no remote history of abnormal bleeding, and no use of Ca-channel blockers  Current Anti-platelet/anticoagulation medication:  none    Assessment of Cardiac Risk:  Denies unstable or severe angina or MI in the last 6 weeks or history of stent placement in the last year   Denies decompensated heart failure (e.g. New onset heart failure, NYHA functional class IV heart failure, or worsening existing heart failure)  Denies significant arrhythmias such as high grade AV block, symptomatic ventricular arrhythmia, newly recognized ventricular tachycardia, supraventricular tachycardia with resting heart rate >100, or symptomatic bradycardia  Denies severe heart valve disease including aortic stenosis or symptomatic mitral stenosis     Exercise Capacity:  Able to walk 4 blocks without symptoms?: Yes  Able to walk 2 flights without symptoms?: Yes    Prior Anesthesia Reactions: No     Personal history of venous thromboembolic disease? No    History of steroid use for >2 weeks within last year? No         Review of Systems:     Review of Systems   Constitutional: Negative.    Respiratory: Negative.     Cardiovascular: Negative.    Gastrointestinal: Negative.    Neurological: Negative.        Current Problem List:     Patient Active Problem List   Diagnosis   • Mixed hyperlipidemia   • Bladder cancer (HCC)       Allergies:     No Known Allergies    Current Medications:       Current Outpatient Medications:   •  atorvastatin  (LIPITOR) 20 mg tablet, Take 1 tablet (20 mg total) by mouth daily with dinner, Disp: 90 tablet, Rfl: 1  •  Otezla 10 & 20 & 30 MG TBPK, Take 30 mg by mouth 2 (two) times a day, Disp: , Rfl:   •  halobetasol (ULTRAVATE) 0.05 % cream, once (Patient not taking: Reported on 7/24/2024), Disp: , Rfl:     Past Medical History:       Past Medical History:   Diagnosis Date   • Drug abuse (HCC) 01/25/2021   • Hyperlipidemia         Past Surgical History:   Procedure Laterality Date   • BLADDER SURGERY     • BURN DEBRIDEMENT SURGERY     • WRIST SURGERY          Family History   Problem Relation Age of Onset   • Diabetes Mother    • Dallas's disease Mother    • Diabetes Father    • Heart disease Father         bypass; valves - 40s        Social History     Socioeconomic History   • Marital status: Single     Spouse name: Not on file   • Number of children: Not on file   • Years of education: Not on file   • Highest education level: Not on file   Occupational History   • Not on file   Tobacco Use   • Smoking status: Former     Current packs/day: 1.50     Average packs/day: 1.5 packs/day for 0.8 years (1.3 ttl pk-yrs)     Types: Cigarettes     Start date: 10/1/2023     Quit date: 2002     Passive exposure: Never   • Smokeless tobacco: Never   Vaping Use   • Vaping status: Some Days   • Start date: 10/2/2023   • Substances: Nicotine, Flavoring   Substance and Sexual Activity   • Alcohol use: Not Currently     Comment: not since oct   • Drug use: Not Currently     Types: Marijuana, Cocaine   • Sexual activity: Not on file   Other Topics Concern   • Not on file   Social History Narrative   • Not on file     Social Determinants of Health     Financial Resource Strain: Not on file   Food Insecurity: Not on file   Transportation Needs: Not on file   Physical Activity: Not on file   Stress: Not on file   Social Connections: Not on file   Intimate Partner Violence: Not on file   Housing Stability: Not on file        Physical Exam:  "    /86   Pulse 78   Temp (!) 97.2 °F (36.2 °C)   Resp 18   Ht 5' 8.5\" (1.74 m)   Wt 80.7 kg (178 lb)   SpO2 98%   BMI 26.67 kg/m²     Physical Exam  Vitals and nursing note reviewed.   Constitutional:       Appearance: Normal appearance. He is well-developed.   HENT:      Head: Normocephalic and atraumatic.      Right Ear: Tympanic membrane, ear canal and external ear normal.      Left Ear: Tympanic membrane, ear canal and external ear normal.      Nose: No mucosal edema or rhinorrhea.      Mouth/Throat:      Pharynx: Oropharynx is clear. Uvula midline.   Eyes:      Conjunctiva/sclera: Conjunctivae normal.   Neck:      Thyroid: No thyromegaly.   Cardiovascular:      Rate and Rhythm: Normal rate and regular rhythm.      Pulses: Normal pulses.      Heart sounds: Normal heart sounds. No murmur heard.  Pulmonary:      Effort: Pulmonary effort is normal.      Breath sounds: Normal breath sounds.   Abdominal:      General: Bowel sounds are normal. There is no distension.      Palpations: There is no hepatomegaly or splenomegaly.      Tenderness: There is no abdominal tenderness.   Musculoskeletal:      Cervical back: Neck supple. No edema.   Lymphadenopathy:      Cervical:      Right cervical: No superficial cervical adenopathy.     Left cervical: No superficial cervical adenopathy.   Skin:     General: Skin is warm and dry.      Findings: No rash.   Neurological:      Mental Status: He is alert.   Psychiatric:         Mood and Affect: Mood normal.         Behavior: Behavior normal.          Data:     Pre-operative work-up    Laboratory Results: I have personally reviewed the pertinent laboratory results/reports      EKG:  not indicated    Recent Results (from the past 672 hour(s))   Lipid panel    Collection Time: 07/11/24  7:14 AM   Result Value Ref Range    Cholesterol 134 See Comment mg/dL    Triglycerides 132 See Comment mg/dL    HDL, Direct 46 >=40 mg/dL    LDL Calculated 62 0 - 100 mg/dL    " Non-HDL-Chol (CHOL-HDL) 88 mg/dl   Comprehensive metabolic panel    Collection Time: 07/11/24  7:14 AM   Result Value Ref Range    Sodium 139 135 - 147 mmol/L    Potassium 4.4 3.5 - 5.3 mmol/L    Chloride 102 96 - 108 mmol/L    CO2 27 21 - 32 mmol/L    ANION GAP 10 4 - 13 mmol/L    BUN 15 5 - 25 mg/dL    Creatinine 0.82 0.60 - 1.30 mg/dL    Glucose, Fasting 72 65 - 99 mg/dL    Calcium 9.6 8.4 - 10.2 mg/dL    AST 21 13 - 39 U/L    ALT 24 7 - 52 U/L    Alkaline Phosphatase 63 34 - 104 U/L    Total Protein 7.4 6.4 - 8.4 g/dL    Albumin 4.4 3.5 - 5.0 g/dL    Total Bilirubin 0.67 0.20 - 1.00 mg/dL    eGFR 114 ml/min/1.73sq m           Assessment & Recommendations:     1. Malignant neoplasm of urinary bladder, unspecified site (HCC)      Pre-Op Evaluation Assessment  33 y.o. male with planned surgery: as above.    Known risk factors for perioperative complications: None.        Current medications which may produce withdrawal symptoms if withheld perioperatively: none.    Pre-Op Evaluation Plan  1. Further preoperative workup as follows:   - None; no further preoperative work-up is required    2. Medication Management/Recommendations:   - None, continue medication regimen including morning of surgery, with sip of water  - Patient has been instructed to avoid herbs or non-directed vitamins the week prior to surgery to ensure no drug interactions with perioperative surgical and anesthetic medications.    3. Prophylaxis for cardiac events with perioperative beta-blockers: not indicated.    4. Patient requires further consultation with: None    Clearance  Patient is CLEARED for surgery without any additional cardiac testing.     SUJIT Conn  [facility]  [address]  [address]  [facility] [address]  Phone#  [phone]  Fax#  [phone]

## 2024-08-29 ENCOUNTER — TELEPHONE (OUTPATIENT)
Age: 34
End: 2024-08-29

## 2024-08-29 NOTE — TELEPHONE ENCOUNTER
Jael from [facility] office calling to have patients PreOp office visit notes from 8/7/24 be faxed to their office.    Please fax to  Fax# [phone]    Please note once faxed.

## 2024-09-18 ENCOUNTER — RA CDI HCC (OUTPATIENT)
Dept: OTHER | Facility: HOSPITAL | Age: 34
End: 2024-09-18

## 2024-09-18 NOTE — PROGRESS NOTES
HCC coding opportunities       Chart reviewed, no opportunity found: CHART REVIEWED, NO OPPORTUNITY FOUND        Patients Insurance        Commercial Insurance: "[employer] Insurance

## 2024-09-25 ENCOUNTER — TELEPHONE (OUTPATIENT)
Dept: FAMILY MEDICINE CLINIC | Facility: CLINIC | Age: 34
End: 2024-09-25

## 2024-09-25 ENCOUNTER — CONSULT (OUTPATIENT)
Dept: FAMILY MEDICINE CLINIC | Facility: CLINIC | Age: 34
End: 2024-09-25
Payer: COMMERCIAL

## 2024-09-25 VITALS
HEIGHT: 69 IN | TEMPERATURE: 97.3 F | RESPIRATION RATE: 18 BRPM | DIASTOLIC BLOOD PRESSURE: 84 MMHG | WEIGHT: 184 LBS | BODY MASS INDEX: 27.25 KG/M2 | SYSTOLIC BLOOD PRESSURE: 136 MMHG | HEART RATE: 56 BPM

## 2024-09-25 DIAGNOSIS — C67.9 MALIGNANT NEOPLASM OF URINARY BLADDER, UNSPECIFIED SITE (HCC): Primary | ICD-10-CM

## 2024-09-25 PROCEDURE — 99213 OFFICE O/P EST LOW 20 MIN: CPT | Performed by: NURSE PRACTITIONER

## 2024-09-25 RX ORDER — APREMILAST 30 MG/1
TABLET, FILM COATED ORAL 2 TIMES DAILY
COMMUNITY
Start: 2024-09-04

## 2024-09-25 NOTE — PROGRESS NOTES
Ambulatory Visit  Name: Bryan Lee      : 1990      MRN: [id number]  Encounter Provider: SUJIT Conn  Encounter Date: 2024   Encounter department: [facility]    Assessment & Plan  Malignant neoplasm of urinary bladder, unspecified site (HCC)  Cleared for procedure with no additional testing required.          History of Present Illness     Here today for follow up.  His procedure was rescheduled for 10/9.  Preop clearance was completed last month including labs and EKG.  No changes to health since that time    Pre-op Exam  Surgery: TURP  Surgeon: Dr. Molina    Previous history of bleeding disorders or clots?: No  Previous Anesthesia reaction?: No  Prolonged steroid use in the last 6 months?: No    Assessment of Cardiac Risk:   - Unstable or severe angina or MI in the last 6 weeks or history of stent placement in the last year?: No   - Decompensated heart failure (e.g. New onset heart failure, NYHA  Class IV heart failure, or worsening existing heart failure)?: No  - Significant arrhythmias such as high grade AV block, symptomatic ventricular arrhythmia, newly recognized ventricular tachycardia, supraventricular tachycardia with resting heart rate >100, or symptomatic bradycardia?: No  - Severe heart valve disease including aortic stenosis or symptomatic mitral stenosis?: No      Pre-operative Risk Factors:  Elevated-risk surgery: No    History of cerebrovascular disease: No    History of ischemic heart disease: No  Pre-operative treatment with insulin: No  Pre-operative creatinine >2 mg/dL: No    History of congestive heart failure: No        Review of Systems   Constitutional: Negative.    Respiratory: Negative.     Cardiovascular: Negative.    Neurological: Negative.      Current Outpatient Medications on File Prior to Visit   Medication Sig Dispense Refill    atorvastatin (LIPITOR) 20 mg tablet Take 1 tablet (20 mg total) by mouth daily with dinner 90 tablet 1     "halobetasol (ULTRAVATE) 0.05 % cream once      Otezla 30 MG TABS 2 (two) times a day      Otezla 10 & 20 & 30 MG TBPK Take 30 mg by mouth 2 (two) times a day (Patient not taking: Reported on 9/25/2024)       No current facility-administered medications on file prior to visit.      Social History     Tobacco Use    Smoking status: Former     Current packs/day: 1.50     Average packs/day: 1.5 packs/day for 1 year (1.5 ttl pk-yrs)     Types: Cigarettes     Start date: 10/1/2023     Quit date: 2002     Passive exposure: Never    Smokeless tobacco: Never   Vaping Use    Vaping status: Every Day    Start date: 10/2/2023    Substances: Nicotine, Flavoring   Substance and Sexual Activity    Alcohol use: Not Currently     Comment: not since oct    Drug use: Not Currently     Types: Marijuana, Cocaine    Sexual activity: Not on file         Objective     /84   Pulse 56   Temp (!) 97.3 °F (36.3 °C)   Resp 18   Ht 5' 8.5\" (1.74 m)   Wt 83.5 kg (184 lb)   BMI 27.57 kg/m²     Physical Exam  Vitals and nursing note reviewed.   Constitutional:       General: He is not in acute distress.     Appearance: Normal appearance.   HENT:      Head: Normocephalic and atraumatic.   Eyes:      Conjunctiva/sclera: Conjunctivae normal.   Neck:      Vascular: No carotid bruit.   Cardiovascular:      Rate and Rhythm: Normal rate and regular rhythm.      Pulses: Normal pulses.      Heart sounds: Normal heart sounds. No murmur heard.  Pulmonary:      Effort: Pulmonary effort is normal.      Breath sounds: Normal breath sounds.   Skin:     General: Skin is warm and dry.   Neurological:      Mental Status: He is alert.   Psychiatric:         Mood and Affect: Mood normal.         Behavior: Behavior normal.         "

## 2024-10-10 ENCOUNTER — OFFICE VISIT (OUTPATIENT)
Age: 34
End: 2024-10-10
Payer: COMMERCIAL

## 2024-10-10 VITALS
HEIGHT: 69 IN | BODY MASS INDEX: 27.25 KG/M2 | DIASTOLIC BLOOD PRESSURE: 82 MMHG | HEART RATE: 56 BPM | SYSTOLIC BLOOD PRESSURE: 129 MMHG | WEIGHT: 184 LBS

## 2024-10-10 DIAGNOSIS — B35.1 ONYCHOMYCOSIS: Primary | ICD-10-CM

## 2024-10-10 PROCEDURE — 99212 OFFICE O/P EST SF 10 MIN: CPT | Performed by: STUDENT IN AN ORGANIZED HEALTH CARE EDUCATION/TRAINING PROGRAM

## 2024-10-14 DIAGNOSIS — E78.2 MIXED HYPERLIPIDEMIA: ICD-10-CM

## 2024-10-15 RX ORDER — ATORVASTATIN CALCIUM 20 MG/1
20 TABLET, FILM COATED ORAL
Qty: 90 TABLET | Refills: 1 | Status: SHIPPED | OUTPATIENT
Start: 2024-10-15

## 2024-10-15 NOTE — PROGRESS NOTES
"This patient was seen on  4/11/24 .    My role is Foot , Ankle, and Wound Specialist    ASSESSMENT     Diagnoses and all orders for this visit:    Onychomycosis           Problem List Items Addressed This Visit    None  Visit Diagnoses       Onychomycosis    -  Primary            PLAN  -Patient was educated regarding their condition  -Reviewed PAS stain, negative for nail fungus  -No other treatment necessary at this time  -Return to clinic as needed for new or worsening podiatric concerns    SUBJECTIVE    Chief Complaint:  Elongated, painful, discolored toenails     Patient ID: Bryan Lee     4/11/2024: Bryan is a pleasant 33-year-old male who presents today for evaluation of his elongated toenails.  He states that they are thick, discolored, and painful.  He states that they make wearing shoes difficult.  He states he is unable to trim these by himself.    10/10/2024: Bryan is overall doing well today.  His PAS stain was resulted as negative for nail fungus.        The following portions of the patient's history were reviewed and updated as appropriate: allergies, current medications, past family history, past medical history, past social history, past surgical history and problem list.    Review of Systems   Constitutional: Negative.    Respiratory: Negative.     Cardiovascular: Negative.    Gastrointestinal: Negative.    Genitourinary: Negative.    Musculoskeletal: Negative.    Skin:  Positive for color change.         OBJECTIVE      /82   Pulse 56   Ht 5' 8.5\" (1.74 m)   Wt 83.5 kg (184 lb)   BMI 27.57 kg/m²        Physical Exam  Constitutional:       Appearance: Normal appearance.   HENT:      Head: Normocephalic and atraumatic.   Eyes:      General:         Right eye: No discharge.         Left eye: No discharge.   Cardiovascular:      Rate and Rhythm: Normal rate and regular rhythm.      Pulses:           Dorsalis pedis pulses are 2+ on the right side and 2+ on the left side.        " Posterior tibial pulses are 2+ on the right side and 2+ on the left side.   Pulmonary:      Effort: Pulmonary effort is normal.      Breath sounds: Normal breath sounds.   Skin:     General: Skin is warm.      Capillary Refill: Capillary refill takes less than 2 seconds.   Neurological:      Mental Status: He is alert and oriented to person, place, and time.      Sensory: Sensation is intact. No sensory deficit.   Psychiatric:         Mood and Affect: Mood normal.       Vascular:  -DP and PT pulses intact b/l  -Capillary refill time <2 sec b/l    MSK:  -Pain on palpation positive to bilateral digits x 10  -No gross deformities noted   -MMT is 5/5 to all muscle compartments of the lower extremity    Neuro:  -Light sensation intact bilaterally  -Protective sensation intact bilaterally    Derm:  -No lesions, abrasions, or open wounds noted  -No noted interdigital maceration, peeling, malodor  -Patient's toe nails x10 are elongated, thickened, discolored, and dystrophic with subungual debris: These findings are consistent with onychomycosis

## 2025-01-03 ENCOUNTER — OFFICE VISIT (OUTPATIENT)
Dept: URGENT CARE | Facility: CLINIC | Age: 35
End: 2025-01-03
Payer: COMMERCIAL

## 2025-01-03 VITALS
WEIGHT: 180 LBS | DIASTOLIC BLOOD PRESSURE: 84 MMHG | HEIGHT: 68 IN | TEMPERATURE: 98.4 F | OXYGEN SATURATION: 99 % | BODY MASS INDEX: 27.28 KG/M2 | SYSTOLIC BLOOD PRESSURE: 143 MMHG | RESPIRATION RATE: 16 BRPM | HEART RATE: 82 BPM

## 2025-01-03 DIAGNOSIS — J01.10 ACUTE NON-RECURRENT FRONTAL SINUSITIS: Primary | ICD-10-CM

## 2025-01-03 PROCEDURE — S9083 URGENT CARE CENTER GLOBAL: HCPCS | Performed by: NURSE PRACTITIONER

## 2025-01-03 PROCEDURE — G0382 LEV 3 HOSP TYPE B ED VISIT: HCPCS | Performed by: NURSE PRACTITIONER

## 2025-01-03 NOTE — PATIENT INSTRUCTIONS
"Augmentin twice a day for 7 days   Flonase 1 spray each nostril daily.  Saline nasal spray as directed to rinse sinus passages.  Pseudoephedrine 1-2 tablets every 6 hours as needed for congestion.  Increase your fluid intake.  Tylenol and/or Motrin as needed for pain or fever.  Follow up with your PCP for worsening or concerning symptoms    Patient Education     Sinusitis in adults   The Basics   Written by the doctors and editors at [facility]   What is sinusitis? -- Sinusitis is a condition that can cause a stuffy nose, pain in the face, and discharge or \"mucus\" from the nose.  The sinuses are hollow areas in the bones of the face (figure 1). They have a thin lining that normally makes a small amount of mucus. When this lining gets irritated or infected, it swells and makes extra mucus. This causes symptoms.  Sinusitis usually happens after a person gets sick with a cold. The germs causing the cold can infect the sinuses, too. Sometimes, other germs can be the cause of the infection. Often, a person feels like their cold is getting better. But then, they get sinusitis and begin to feel sick again.  What are the symptoms of sinusitis? -- Common symptoms of sinusitis include:   Stuffy or blocked nose   Thick white, yellow, or green discharge from the nose   Pain in the teeth   Pain or pressure in the face - This often feels worse when a person bends forward.  People with sinusitis can also have other symptoms, such as:   Fever   Cough   Trouble smelling   Ear pressure or fullness   Headache   Bad breath   Feeling tired  Most of the time, symptoms start to improve in 7 to 10 days.  Should I see a doctor or nurse? -- See your doctor or nurse if your symptoms last more than 10 days, or if your symptoms first get better but then get worse.  Rarely, sinusitis can lead to serious problems. See your doctor or nurse right away (do not wait 10 days) if you have:   Fever higher than 102°F (38.9°C)   Sudden and severe pain in " the face and head   Trouble seeing, or seeing double   Trouble thinking clearly   Swelling or redness around 1 or both eyes   Stiff neck  Is there anything I can do on my own to feel better? -- Yes. To help with your symptoms, you can:   Take an over-the-counter pain reliever to reduce the pain.   Rinse your nose and sinuses with salt water a few times a day - Ask your doctor or nurse about the best way to do this.   Drink plenty of fluids - Staying hydrated might help to thin the mucus and make it drain more easily.  Your doctor might also recommend a steroid nose spray to reduce the swelling in your nose, especially if you have allergies. Talk to your doctor if you are thinking of using a steroid spray.  How is sinusitis treated? -- Most of the time, sinusitis does not need to be treated with antibiotic medicines. This is because most sinusitis is caused by viruses, not bacteria, and antibiotics do not kill viruses. In fact, even sinusitis caused by bacteria will usually get better on its own without antibiotics.  Some people with sinusitis do need treatment with antibiotics. If your symptoms have not improved after 10 days, ask your doctor if you should take antibiotics. They might recommend that you wait 1 more week to see if your symptoms improve. But if you have symptoms such as a fever or a lot of pain, they might prescribe antibiotics. If you do get antibiotics, follow all of your doctor's instructions about taking them.  What if my symptoms do not get better? -- If your symptoms do not get better, talk with your doctor or nurse. They might order tests to figure out why you still have symptoms. These can include:   CT scan or other imaging tests - Imaging tests create pictures of the inside of the body.   A test to look inside the sinuses - For this test, a doctor puts a thin tube with a camera on the end into the nose and up into the sinuses.  Some people get a lot of sinus infections or have symptoms that  "last at least 3 months. These people can have a different type of sinusitis called \"chronic sinusitis.\" Chronic sinusitis can be caused by different things. For example, some people have growths inside their nose or sinuses that are called \"polyps.\" Other people have allergies that cause their symptoms.  Chronic sinusitis can be treated in different ways. If you have chronic sinusitis, talk with your doctor about which treatments are right for you.  All topics are updated as new evidence becomes available and our peer review process is complete.  This topic retrieved from [employer] on: Feb 28, 2024.  Topic 74212 Version 21.0  Release: 32.2.4 - C32.58  © 2024 UpToDate, Inc. and/or its affiliates. All rights reserved.  figure 1: Sinuses of the face     The sinuses are hollow areas in the bones of the face. This drawing shows where the sinuses are, from the side and front views. There are 4 pairs of sinuses, named for the bones around them: sphenoid, frontal, ethmoid, and maxillary.  Graphic 999235 Version 3.0  Consumer Information Use and Disclaimer   Disclaimer: This generalized information is a limited summary of diagnosis, treatment, and/or medication information. It is not meant to be comprehensive and should be used as a tool to help the user understand and/or assess potential diagnostic and treatment options. It does NOT include all information about conditions, treatments, medications, side effects, or risks that may apply to a specific patient. It is not intended to be medical advice or a substitute for the medical advice, diagnosis, or treatment of a health care provider based on the health care provider's examination and assessment of a patient's specific and unique circumstances. Patients must speak with a health care provider for complete information about their health, medical questions, and treatment options, including any risks or benefits regarding use of medications. This information does not endorse " any treatments or medications as safe, effective, or approved for treating a specific patient. UpToDate, Inc. and its affiliates disclaim any warranty or liability relating to this information or the use thereof.The use of this information is governed by the Terms of Use, available at https://www.[employer].com/en/know/clinical-effectiveness-terms. 2024© UpToDate, Inc. and its affiliates and/or licensors. All rights reserved.  Copyright   © 2024 UpToDate, Inc. and/or its affiliates. All rights reserved.

## 2025-01-03 NOTE — PROGRESS NOTES
[facility]        NAME: Bryan Lee is a 34 y.o. male  : 1990    MRN: [id number]  DATE: January 3, 2025  TIME: 4:40 PM    Assessment and Plan   Acute non-recurrent frontal sinusitis [J01.10]  1. Acute non-recurrent frontal sinusitis  amoxicillin-clavulanate (AUGMENTIN) 875-125 mg per tablet            Patient Instructions   Augmentin twice a day for 7 days   Flonase 1 spray each nostril daily.  Saline nasal spray as directed to rinse sinus passages.  Pseudoephedrine 1-2 tablets every 6 hours as needed for congestion.  Increase your fluid intake.  Tylenol and/or Motrin as needed for pain or fever.  Follow up with your PCP for worsening or concerning symptoms    Follow up with PCP in 3-5 days.  Proceed to  ER if symptoms worsen.    Chief Complaint     Chief Complaint   Patient presents with    Cold Like Symptoms    Cough     URI s/s x 1 week.          History of Present Illness       Patient is a 34-year-old male presenting with 1 week of cough and frontal sinus congestion.  He states he has had a severe frontal headache for the last 3 days.  Also reports bilateral ear pain.  Denies fever or chills.        Review of Systems   Review of Systems   Constitutional:  Negative for activity change, chills and fever.   HENT:  Positive for congestion, ear pain, sinus pressure and sinus pain. Negative for sore throat.    Respiratory:  Positive for cough.    Neurological:  Positive for headaches.         Current Medications       Current Outpatient Medications:     amoxicillin-clavulanate (AUGMENTIN) 875-125 mg per tablet, Take 1 tablet by mouth every 12 (twelve) hours for 7 days, Disp: 14 tablet, Rfl: 0    atorvastatin (LIPITOR) 20 mg tablet, take 1 tablet by mouth EVERY EVENING WITH DINNER, Disp: 90 tablet, Rfl: 1    halobetasol (ULTRAVATE) 0.05 % cream, once, Disp: , Rfl:     Otezla 10 & 20 & 30 MG TBPK, Take 30 mg by mouth 2 (two) times a day (Patient not taking: Reported on 2024), Disp: , Rfl:     " Otezla 30 MG TABS, 2 (two) times a day, Disp: , Rfl:     Current Allergies     Allergies as of 01/03/2025    (No Known Allergies)            The following portions of the patient's history were reviewed and updated as appropriate: allergies, current medications, past family history, past medical history, past social history, past surgical history and problem list.     Past Medical History:   Diagnosis Date    Drug abuse (HCC) 01/25/2021    Hyperlipidemia        Past Surgical History:   Procedure Laterality Date    BLADDER SURGERY      BURN DEBRIDEMENT SURGERY      WRIST SURGERY         Family History   Problem Relation Age of Onset    Diabetes Mother     Rincon's disease Mother     Diabetes Father     Heart disease Father         bypass; valves - 40s         Medications have been verified.        Objective   /84   Pulse 82   Temp 98.4 °F (36.9 °C)   Resp 16   Ht 5' 8\" (1.727 m)   Wt 81.6 kg (180 lb)   SpO2 99%   BMI 27.37 kg/m²        Physical Exam     Physical Exam  Vitals reviewed.   Constitutional:       General: He is awake. He is not in acute distress.     Appearance: Normal appearance. He is normal weight.   HENT:      Head: Normocephalic.      Right Ear: Hearing, ear canal and external ear normal. Tympanic membrane is erythematous and bulging.      Left Ear: Hearing, ear canal and external ear normal. Tympanic membrane is erythematous.      Nose: Nose normal.      Mouth/Throat:      Lips: Pink.      Pharynx: Oropharynx is clear.   Cardiovascular:      Rate and Rhythm: Normal rate and regular rhythm.      Heart sounds: Normal heart sounds, S1 normal and S2 normal.   Pulmonary:      Effort: Pulmonary effort is normal.      Breath sounds: Normal breath sounds. No decreased breath sounds, wheezing or rhonchi.   Skin:     General: Skin is warm and moist.   Neurological:      General: No focal deficit present.      Mental Status: He is alert and oriented to person, place, and time.   Psychiatric:  "        Behavior: Behavior is cooperative.

## 2025-02-28 ENCOUNTER — RA CDI HCC (OUTPATIENT)
Dept: OTHER | Facility: HOSPITAL | Age: 35
End: 2025-02-28

## 2025-02-28 NOTE — PROGRESS NOTES
HCC coding opportunities       Chart reviewed, no opportunity found: CHART REVIEWED, NO OPPORTUNITY FOUND        Patients Insurance        Commercial Insurance: [employer]

## 2025-03-07 ENCOUNTER — CONSULT (OUTPATIENT)
Dept: FAMILY MEDICINE CLINIC | Facility: CLINIC | Age: 35
End: 2025-03-07
Payer: COMMERCIAL

## 2025-03-07 VITALS
SYSTOLIC BLOOD PRESSURE: 130 MMHG | DIASTOLIC BLOOD PRESSURE: 88 MMHG | BODY MASS INDEX: 27.55 KG/M2 | TEMPERATURE: 97.6 F | HEART RATE: 64 BPM | WEIGHT: 186 LBS | HEIGHT: 69 IN | RESPIRATION RATE: 16 BRPM

## 2025-03-07 DIAGNOSIS — E78.2 MIXED HYPERLIPIDEMIA: ICD-10-CM

## 2025-03-07 DIAGNOSIS — C67.9 MALIGNANT NEOPLASM OF URINARY BLADDER, UNSPECIFIED SITE (HCC): Primary | ICD-10-CM

## 2025-03-07 DIAGNOSIS — Z13.6 SCREENING FOR CARDIOVASCULAR CONDITION: ICD-10-CM

## 2025-03-07 PROCEDURE — 99214 OFFICE O/P EST MOD 30 MIN: CPT | Performed by: NURSE PRACTITIONER

## 2025-03-07 RX ORDER — ATORVASTATIN CALCIUM 20 MG/1
20 TABLET, FILM COATED ORAL
Qty: 90 TABLET | Refills: 1 | Status: SHIPPED | OUTPATIENT
Start: 2025-03-07

## 2025-03-07 NOTE — PROGRESS NOTES
Pre-operative Clearance  Name: Bryan Lee      : 1990      MRN: [id number]  Encounter Provider: SUJIT Conn  Encounter Date: 3/7/2025   Encounter department: [facility]    Assessment & Plan  Malignant neoplasm of urinary bladder, unspecified site (HCC)         Mixed hyperlipidemia    Orders:  •  atorvastatin (LIPITOR) 20 mg tablet; Take 1 tablet (20 mg total) by mouth daily with dinner    Screening for cardiovascular condition    Orders:  •  Comprehensive metabolic panel; Future  •  Lipid Panel with Direct LDL reflex; Future    Pre-operative Clearance:     Revised Cardiac Risk Index:  RCI RISK CLASS I (0 risk factors, risk of major cardiac complications approximately 0.5%)    Clearance:  Patient is medically optimized (CLEARED) for proposed surgery without any additional cardiac testing.      Medication Instructions:   - Avoid herbs or non-directed vitamins one week prior to surgery    - Avoid aspirin containing medications or non-steroidal anti-inflammatory drugs one week preceding surgery    - May take tylenol for pain up until the night before surgery    - Hyperlipidemia meds: Continue to take this medication on your normal schedule.      Depression Screening and Follow-up Plan: Patient was screened for depression during today's encounter. They screened negative with a PHQ-2 score of 0.      Tobacco Cessation Counseling: Tobacco cessation counseling was provided.       History of Present Illness     He is scheduled for repeat cystoscopy.  He did have labs done prior to visit today as well as EKG.    Pre-op Exam  Surgery: cystoscopy  Anticipated Date of Surgery: 3/17/25    Previous history of bleeding disorders or clots?: No  Previous Anesthesia reaction?: No  Prolonged steroid use in the last 6 months?: No    Assessment of Cardiac Risk:   - Unstable or severe angina or MI in the last 6 weeks or history of stent placement in the last year?: No   - Decompensated heart  failure (e.g. New onset heart failure, NYHA  Class IV heart failure, or worsening existing heart failure)?: No  - Significant arrhythmias such as high grade AV block, symptomatic ventricular arrhythmia, newly recognized ventricular tachycardia, supraventricular tachycardia with resting heart rate >100, or symptomatic bradycardia?: No  - Severe heart valve disease including aortic stenosis or symptomatic mitral stenosis?: No      Pre-operative Risk Factors:  Elevated-risk surgery: No    History of cerebrovascular disease: No    History of ischemic heart disease: No  Pre-operative treatment with insulin: No  Pre-operative creatinine >2 mg/dL: No    History of congestive heart failure: No    Review of Systems   Constitutional: Negative.    Respiratory: Negative.     Cardiovascular: Negative.    Gastrointestinal: Negative.    Genitourinary: Negative.    Neurological: Negative.    Psychiatric/Behavioral: Negative.       Past Medical History   Past Medical History:   Diagnosis Date   • Drug abuse (HCC) 01/25/2021   • Hyperlipidemia      Past Surgical History:   Procedure Laterality Date   • BLADDER SURGERY     • BURN DEBRIDEMENT SURGERY     • WRIST SURGERY       Family History   Problem Relation Age of Onset   • Diabetes Mother    • Perez's disease Mother    • Diabetes Father    • Heart disease Father         bypass; valves - 40s     Social History     Tobacco Use   • Smoking status: Former     Current packs/day: 1.50     Average packs/day: 1.5 packs/day for 1.4 years (2.2 ttl pk-yrs)     Types: Cigarettes     Start date: 10/1/2023     Quit date: 2002     Passive exposure: Never   • Smokeless tobacco: Never   Vaping Use   • Vaping status: Every Day   • Start date: 10/2/2023   • Substances: Nicotine, Flavoring   Substance and Sexual Activity   • Alcohol use: Not Currently     Comment: not since oct   • Drug use: Not Currently     Types: Marijuana, Cocaine   • Sexual activity: Not on file     Current Outpatient  "Medications on File Prior to Visit   Medication Sig   • halobetasol (ULTRAVATE) 0.05 % cream once (Patient taking differently: in the morning Apply to joints)   • Otezla 30 MG TABS 2 (two) times a day     No Known Allergies  Objective   /88   Pulse 64   Temp 97.6 °F (36.4 °C)   Resp 16   Ht 5' 9\" (1.753 m)   Wt 84.4 kg (186 lb)   BMI 27.47 kg/m²     Physical Exam  Vitals and nursing note reviewed.   Constitutional:       Appearance: Normal appearance. He is well-developed.   HENT:      Head: Normocephalic and atraumatic.      Right Ear: Tympanic membrane, ear canal and external ear normal.      Left Ear: Tympanic membrane, ear canal and external ear normal.      Nose: No mucosal edema or rhinorrhea.      Mouth/Throat:      Pharynx: Oropharynx is clear. Uvula midline.   Eyes:      Conjunctiva/sclera: Conjunctivae normal.   Neck:      Thyroid: No thyromegaly.   Cardiovascular:      Rate and Rhythm: Normal rate and regular rhythm.      Pulses: Normal pulses.      Heart sounds: Normal heart sounds. No murmur heard.  Pulmonary:      Effort: Pulmonary effort is normal.      Breath sounds: Normal breath sounds.   Abdominal:      General: Bowel sounds are normal. There is no distension.      Palpations: There is no hepatomegaly or splenomegaly.      Tenderness: There is no abdominal tenderness.   Musculoskeletal:      Cervical back: Neck supple. No edema.   Lymphadenopathy:      Cervical:      Right cervical: No superficial cervical adenopathy.     Left cervical: No superficial cervical adenopathy.   Skin:     General: Skin is warm and dry.      Findings: No rash.   Neurological:      Mental Status: He is alert.   Psychiatric:         Mood and Affect: Mood normal.         Behavior: Behavior normal.           SUJIT Conn  "

## 2025-03-07 NOTE — ASSESSMENT & PLAN NOTE
Orders:  •  atorvastatin (LIPITOR) 20 mg tablet; Take 1 tablet (20 mg total) by mouth daily with dinner

## 2025-03-13 ENCOUNTER — TELEPHONE (OUTPATIENT)
Age: 35
End: 2025-03-13

## 2025-03-13 NOTE — TELEPHONE ENCOUNTER
Susanna with [facility] Called states received the office note but there is no documentation that patient is cleared for surgery. This is required for anesthesiologist. Please complete note and fax to [phone]. Patient is having surgery on Monday 3-.

## 2025-03-13 NOTE — TELEPHONE ENCOUNTER
[facility] called requesting patient's pre op clearance to please be faxed to them asap to fax number